# Patient Record
Sex: MALE | Race: WHITE | NOT HISPANIC OR LATINO | Employment: OTHER | URBAN - METROPOLITAN AREA
[De-identification: names, ages, dates, MRNs, and addresses within clinical notes are randomized per-mention and may not be internally consistent; named-entity substitution may affect disease eponyms.]

---

## 2022-06-09 ENCOUNTER — TELEPHONE (OUTPATIENT)
Dept: UROLOGY | Facility: CLINIC | Age: 57
End: 2022-06-09
Payer: MEDICARE

## 2022-06-09 NOTE — TELEPHONE ENCOUNTER
Spoke w pts wife who was informed our urology group does not perform left adrenal mass surgery pts wife was informed she will need to see a specialist at the main campus which is dr Clarke  pts wife voiced understanding.

## 2022-06-09 NOTE — TELEPHONE ENCOUNTER
----- Message from Amanda Sweeney sent at 6/9/2022 12:53 PM CDT -----  Regarding: sooner appointment  Contact: Wife/Kavita/[phone]  Type:  Sooner Appointment Request    Caller is requesting a sooner appointment.  Caller declined first available appointment listed below.  Caller will not accept being placed on the waitlist and is requesting a message be sent to doctor.    Name of Caller:  Wife/Kavita/[phone]  When is the first available appointment?  7/7/22  Symptoms:  large right adrenal mass

## 2022-07-21 ENCOUNTER — HOSPITAL ENCOUNTER (OUTPATIENT)
Dept: CARDIOLOGY | Facility: CLINIC | Age: 57
Discharge: HOME | End: 2022-07-21
Payer: MEDICARE

## 2022-07-21 ENCOUNTER — TELEPHONE (OUTPATIENT)
Dept: SURGERY | Facility: CLINIC | Age: 57
End: 2022-07-21
Payer: MEDICARE

## 2022-07-21 ENCOUNTER — OFFICE VISIT (OUTPATIENT)
Dept: SURGERY | Facility: CLINIC | Age: 57
End: 2022-07-21
Payer: MEDICARE

## 2022-07-21 VITALS
HEIGHT: 75 IN | HEART RATE: 62 BPM | RESPIRATION RATE: 18 BRPM | SYSTOLIC BLOOD PRESSURE: 149 MMHG | TEMPERATURE: 99 F | DIASTOLIC BLOOD PRESSURE: 84 MMHG | WEIGHT: 250 LBS | OXYGEN SATURATION: 98 % | BODY MASS INDEX: 31.08 KG/M2

## 2022-07-21 DIAGNOSIS — E27.8 ADRENAL NODULE: ICD-10-CM

## 2022-07-21 DIAGNOSIS — E27.8 OTHER SPECIFIED DISORDERS OF ADRENAL GLAND: ICD-10-CM

## 2022-07-21 DIAGNOSIS — E27.8 ADRENAL NODULE: Primary | ICD-10-CM

## 2022-07-21 DIAGNOSIS — E27.8 RIGHT ADRENAL MASS: Primary | ICD-10-CM

## 2022-07-21 PROCEDURE — 99999 PR PBB SHADOW E&M-EST. PATIENT-LVL V: CPT | Mod: PBBFAC,,, | Performed by: STUDENT IN AN ORGANIZED HEALTH CARE EDUCATION/TRAINING PROGRAM

## 2022-07-21 PROCEDURE — 99999 PR PBB SHADOW E&M-EST. PATIENT-LVL V: ICD-10-PCS | Mod: PBBFAC,,, | Performed by: STUDENT IN AN ORGANIZED HEALTH CARE EDUCATION/TRAINING PROGRAM

## 2022-07-21 PROCEDURE — 93010 ELECTROCARDIOGRAM REPORT: CPT | Mod: S$PBB,,, | Performed by: INTERNAL MEDICINE

## 2022-07-21 PROCEDURE — 93005 ELECTROCARDIOGRAM TRACING: CPT | Mod: PBBFAC | Performed by: INTERNAL MEDICINE

## 2022-07-21 PROCEDURE — 99215 OFFICE O/P EST HI 40 MIN: CPT | Mod: PBBFAC | Performed by: STUDENT IN AN ORGANIZED HEALTH CARE EDUCATION/TRAINING PROGRAM

## 2022-07-21 PROCEDURE — 99205 OFFICE O/P NEW HI 60 MIN: CPT | Mod: S$PBB,,, | Performed by: STUDENT IN AN ORGANIZED HEALTH CARE EDUCATION/TRAINING PROGRAM

## 2022-07-21 PROCEDURE — 99205 PR OFFICE/OUTPT VISIT, NEW, LEVL V, 60-74 MIN: ICD-10-PCS | Mod: S$PBB,,, | Performed by: STUDENT IN AN ORGANIZED HEALTH CARE EDUCATION/TRAINING PROGRAM

## 2022-07-21 PROCEDURE — 93010 EKG 12-LEAD: ICD-10-PCS | Mod: S$PBB,,, | Performed by: INTERNAL MEDICINE

## 2022-07-21 RX ORDER — FLUTICASONE PROPIONATE 50 MCG
1 SPRAY, SUSPENSION (ML) NASAL DAILY
COMMUNITY

## 2022-07-21 RX ORDER — DEXAMETHASONE 1 MG/1
1 TABLET ORAL ONCE
Qty: 1 TABLET | Refills: 0 | Status: SHIPPED | OUTPATIENT
Start: 2022-07-21 | End: 2022-07-21

## 2022-07-21 RX ORDER — LISINOPRIL 10 MG/1
10 TABLET ORAL DAILY
COMMUNITY
Start: 2022-05-04

## 2022-07-21 RX ORDER — VARENICLINE TARTRATE 1 MG/1
1 TABLET, FILM COATED ORAL 2 TIMES DAILY
COMMUNITY
Start: 2022-07-07 | End: 2023-07-26

## 2022-07-21 RX ORDER — IBUPROFEN 800 MG/1
800 TABLET ORAL DAILY
COMMUNITY

## 2022-07-21 RX ORDER — TRAMADOL HYDROCHLORIDE 50 MG/1
50 TABLET ORAL EVERY 6 HOURS PRN
Status: ON HOLD | COMMUNITY
End: 2022-08-23 | Stop reason: HOSPADM

## 2022-07-21 NOTE — TELEPHONE ENCOUNTER
Spoke with Martha at [facility]  [phone] Requested for SMX Ct Lumbar 4/22/22 and Spoke with Sebastian from [facility] [phone] for MRI Abdomen 5/7/22 to SMX. Sent STAT request to Ms. Walton in [employer] for request for SMX of imaging per Dr. Dunlap, can take up to 24hrs. Notified Dr. Dunlap.

## 2022-07-21 NOTE — PROGRESS NOTES
Endocrine Surgery History & Physical     REFERRING PROVIDER: Self, Aaareferral - Will be establishing care with Dr. Best (Endocrinology)    REASON FOR VISIT: Adrenal mass, right    HPI: Tomas Hong is a 56 y.o. male patient with a history notable for tobacco use, carpal tunnel syndrome, hypertension, seasonal allergies and ulcerative colitis who presents in consultation for management of an incidental right adrenal mass.   An adrenal mass was noted incidentally on CT scan in 2014 done for diffuse abdominal pain. Radiology imaging documented a right 4 x 2.7 cm adrenal adenoma with HU measuring 8.  More detailed adrenal protocol radiologic imaging was not performed.  A CT scan was done in 4/2022 at [facility] that is available for review and demonstrates interval growth to approximately 4.8 cm.    With respect to symptoms concerning for hyperaldosteronism, he does have hypertension hypertension, currently he is on lisinopril.  He does not have hypokalemia.      With respect to symptoms concerning for hypercortisolism, he denies diabetes, glucose intolerance, significant weight gain, facial plethora, thoracodorsal fat pad, acne, abnormal hair growth, central/truncal obesity, violaceous striae, poor wound healing, proximal muscle weakness, or Cushingoid features.      With respect to symptoms related to pheochromocytoma, he reports intermittent dizzy spells, he denies headaches, palpitations, tachycardia, tremors, skin flushing, anxiety attacks, weight loss, sleep disturbances, sweating.  He denies significant flank or back pain.  The patient is not currently on tricyclic antidepressants, decongestants, amphetamines, reserpine or phenoxybenzamine.    The patient denies personal or family history of endocrinopathies, endocrine cancer, thyroid cancer, medullary thyroid cancer, hyperparathyroidism, hypercalcemia, pheochromocytomas, paraganglioma, or other neuroendocrine tumors.       LABORATORY  STUDIES:  I personally and independently reviewed relevant lab test results, including the following:    Component      Latest Ref Rng & Units 7/28/2022 7/21/2022   Metanephrine, Free      < OR = 57 pg/mL  47   Metanephrine, Total, Plasma      < OR = 205 pg/mL  100   Normetanephrine, Free      < OR = 148 pg/mL  53   ALDOSTERONE      ng/dL  5.1   Renin Activity      ng/mL/h  3.4   ACTH      0 - 46 pg/mL 7    Cortisol, 8 AM      4.30 - 22.40 ug/dL 1.10 (L)         PAST MEDICAL HISTORY:  Patient Active Problem List   Diagnosis    Right adrenal mass    Hypertension    Ulcerative colitis        PAST SURGICAL HISTORY:  Past Surgical History:   Procedure Laterality Date    CARPAL TUNNEL RELEASE Left 05/01/2022    Carpel tunnel surgery left    JOINT REPLACEMENT Left 05/01/2016    Left Hip replacement        MEDICATIONS:  Current Outpatient Medications   Medication Sig Dispense Refill    fluticasone propionate (FLONASE) 50 mcg/actuation nasal spray 1 spray by Nasal route once daily at 6am.      ibuprofen (ADVIL,MOTRIN) 800 MG tablet Take 800 mg by mouth once daily at 6am.      ibuprofen 800 mg/200 mL (4 mg/mL) PgBk       lisinopriL 10 MG tablet Take 10 mg by mouth once daily.      traMADoL (ULTRAM) 50 mg tablet Take 50 mg by mouth every 6 (six) hours as needed for Pain. 3 times/ weekly      varenicline (CHANTIX) 1 mg Tab Take 1 mg by mouth 2 (two) times daily.       No current facility-administered medications for this visit.       ALLERGIES:  Review of patient's allergies indicates:  No Known Allergies    SOCIAL HISTORY:  Social History     Socioeconomic History    Marital status:    Occupational History    Occupation: disabled   Tobacco Use    Smoking status: Current Every Day Smoker     Packs/day: 0.25     Years: 40.00     Pack years: 10.00     Types: Cigarettes    Smokeless tobacco: Never Used    Tobacco comment: 2 packs  cig/ 40 years- as of 7/21/2022 5 cigs/day   Substance and Sexual Activity     "Alcohol use: No    Drug use: Never    Sexual activity: Yes     Partners: Female         FAMILY HISTORY:  History reviewed. No pertinent family history.      REVIEW OF SYSTEMS:  A detailed review of systems was reviewed with the patient, pertinent positives and negatives are presented in the note and is otherwise negative.    PHYSICAL EXAMINATION:  Vital Signs: BP (!) 149/84 (BP Location: Right arm, Patient Position: Sitting, BP Method: Medium (Automatic))   Pulse 62   Temp 98.8 °F (37.1 °C) (Oral)   Resp 18   Ht 6' 3" (1.905 m)   Wt 113.4 kg (250 lb)   SpO2 98%   BMI 31.25 kg/m²     Constitutional: no acute distress, comfortable, well appearing, no hirsutism  HENT: no scleral icterus, conjunctiva are clear, no supraclavicular/dorsocervical fat pad, no facial plethora  Neck: supple, trachea in midline  Heme/Lymph: no cervical or supraclavicular lymphadenopathy  Respiratory: normal respiratory effort, no wheezes or stridor  Cardiovascular: regular rate and rhythm  Abdomen: soft, non distended, no scars, no hernias, no central obesity  Extremities: no edema  Skin: warm and dry, no rashes, no violacious striae, no hyperpigmentation, no acne  Neurologic: no resting tremor of outstretched hands, voice adequate, hip flexion 5/5 bilaterally, no gross focal defects  Vascular: radial pulses palpable bilaterally  Psychiatric: affect normal      IMAGING STUDIES:  I personally and independently reviewed, visualized and interpreted the images of the below listed radiology studies (including non contrasted limited CT from 4/22/22 done at [facility] and prior CT done in 2014) my findings are notable for a lipid rich right adrenal mass measuring over 4.4cm with interval growth since 2014.  Adrenal protocol CT pending.    IMPRESSION:  I had the pleasure of seeing Mr. Hong in endocrine surgical consultation regarding the >4cm right adrenal mass.  I discussed the implications and treatment of incidentally noted " adrenal masses.  The majority of these are benign non-functional tumors.  I have recommended further laboratory evaluation to rule out a functional tumor and dedicated adrenal protocol CT scan.    I have discussed with Mr. Hong at length regarding the current surgical and non-surgical treatment options.  Based on the current clinical findings and patient's preference he will necessitate a right laparoscopic assisted robotic adrenalectomy.    We discussed the risks and benefits of adrenalectomy, specifically the risk of significant bleeding, scarring, infection, hernia, injury to adjacent organs (notably the liver, duodenum, colon and kidney) necessitating additional procedures or drains, anesthesia risks, death and stroke.  We also discussed the risk of postoperative adrenal insufficiency and the potential need for steroid replacement temporarily or permanently after surgery.    The patient expressed understanding of all the risks at hand, agreed with this plan and informed consent was signed and witnessed.  The patient has my contact information, and understands to call me with any additional questions or concerns in the interval.      Problem List Items Addressed This Visit        Endocrine    Right adrenal mass - Primary    Relevant Orders    CBC Auto Differential (Completed)    Comprehensive Metabolic Panel (Completed)    Metanephrines, Plasma Free (Completed)    Aldosterone (Completed)    Renin (Completed)    ACTH (Completed)    Cortisol, 8AM (Completed)      Other Visit Diagnoses     Other specified disorders of adrenal gland        Relevant Orders    CT Abdomen With Without Contrast         Rocio Dunlap MD  Endocrine Surgery   7/21/22

## 2022-07-21 NOTE — PATIENT INSTRUCTIONS
I would like to check the additional laboratory studies to evaluate for abnormal adrenal hormone production.      Prior to having your labs drawn, please  the dexamethasone tablet from the pharmacy.     Day 1:  - Please get the first set of labs (aldosterone, renin, plasma metanephrines, CBC, CMP)    Day 2:   - Take the dexamethasone tablet at 11pm the NIGHT BEFORE  - At 8 AM you will have additional labs checked (cortisol and dexamethasone levels)  - You will have an appointment with Dr. Best (Endocrinology)

## 2022-07-27 PROBLEM — K51.90 ULCERATIVE COLITIS: Status: ACTIVE | Noted: 2022-07-27

## 2022-07-27 PROBLEM — I10 HYPERTENSION: Status: ACTIVE | Noted: 2022-07-27

## 2022-07-27 NOTE — PROGRESS NOTES
"Subjective:      Chief Complaint:  Right-sided adrenal adenoma    History of Present Illness  56 y.o. male with ulcerative colitis presents for establishment of care regarding right-sided adrenal mass.  Patient had been seen by Dr. Dunlap of Endocrine surgery on 07/21/2022 regarding mass and appropriate functional testing with dexamethasone suppression test with ACTH, renin/aldosterone, and plasma metanephrines had been ordered.    - Has occasional dizzy spells about once a week, no associated activities, no LOC, no palpitations, notices that he "pulls to the right" while walking during these spells which last 20-30 seconds.     Regarding Right Adrenal Incidentaloma:     - Found to have an incidental right adrenal nodule on CT scan in July, 2014  - Dedicated imaging has not yet been performed  - Adrenal lesion imaging characteristics: 4 cm; 16 HU on initial CT in 2014    CT scan from 07/30/2014      CT scan from 04/22/2022      - Functional evaluation with plasma and urine metanephrines: In process    - Baseline ACTH: None (ordered)  - Baseline DHEAS: None  - Last BMD: None  - Normal aldosterone level of 5.1 with a normal renin level of 3.4 on 07/21/2022.  Normal dexamethasone suppression test with cortisol of 1.1 on 07/28/2022.  Lab Results   Component Value Date    LABCORT 1.10 (L) 07/28/2022    ALDOSTERONE 5.1 07/21/2022    LABRENI 3.4 07/21/2022       - Symptoms of excess cortisol or pheochromocytoma: Abdominal discomfort, hypertension, paroxysmal symptoms (syncope, pallor, dizziness; see HPI above), polyuria/polydipsia, easy bruisability  - Denies: palpitations, diabetes/new hyperglycemia, abnormal stretch marks, muscle weakness, fractures or osteoporosis    - Contributing factors: Weight gain  - Denies: atherosclerosis, potassium abnormalities, recent steroids, recent clonidine, recent spironolactone, family history of adrenal cortical carcinoma     - I independently reviewed all pertinent outside records and " "imaging    Objective:   /80 (BP Location: Left arm, Patient Position: Sitting, BP Method: Large (Manual))   Ht 6' 3" (1.905 m)   Wt 113 kg (249 lb 3.7 oz)   BMI 31.15 kg/m²   Physical Exam  Constitutional:       Appearance: Normal appearance.   Cardiovascular:      Rate and Rhythm: Normal rate and regular rhythm.      Pulses: Normal pulses.      Heart sounds: Normal heart sounds.   Pulmonary:      Effort: Pulmonary effort is normal.      Breath sounds: Normal breath sounds.   Abdominal:      General: Abdomen is flat.      Palpations: Abdomen is soft.      Comments: No striae   Neurological:      General: No focal deficit present.      Mental Status: He is alert and oriented to person, place, and time.   Psychiatric:         Mood and Affect: Mood normal.         Behavior: Behavior normal.       BP Readings from Last 1 Encounters:   07/28/22 138/80      Wt Readings from Last 1 Encounters:   07/28/22 0845 113 kg (249 lb 3.7 oz)     Body mass index is 31.15 kg/m².    Lab Review:   No results found for: HGBA1C  No results found for: CHOL, HDL, LDLCALC, TRIG, CHOLHDL  Lab Results   Component Value Date     07/21/2022    K 4.9 07/21/2022     07/21/2022    CO2 28 07/21/2022    GLU 87 07/21/2022    BUN 13 07/21/2022    CREATININE 1.3 07/21/2022    CALCIUM 9.8 07/21/2022    PROT 7.9 07/21/2022    ALBUMIN 4.7 07/21/2022    BILITOT 0.4 07/21/2022    ALKPHOS 71 07/21/2022    AST 27 07/21/2022    ALT 26 07/21/2022    ANIONGAP 10 07/21/2022    ESTGFRAFRICA >60.0 07/21/2022    EGFRNONAA >60.0 07/21/2022       All pertinent labs reviewed    Assessment and Plan     Right adrenal mass  Key History and Diagnostic Findings  - Normal aldosterone level of 5.1 with a normal renin level of 3.4 on 07/21/2022.  Normal dexamethasone suppression test with cortisol of 1.1 on 07/28/2022.    Plan  - Await results of plasma metanephrines for assessment of pheochromocytoma  - Will await endocrine surgery plan after biochemical " testing    - I have independently reviewed the imaging    Hypertension  - Currently on lisinopril 10 mg daily, not on any medications that would interfere with workup for pheochromocytoma  - Await biochemical testing as above    Ulcerative colitis  - Patient takes ibuprofen on an almost daily basis for lower back pain; he was counseled again such regular use and to use Tylenol when able to prevent gastric ulceration and renal damage that may contribute hypertension  - Management per ALOFNSO Trcay DO  [facility], [address]th [address]  [address] [place], [place], [address]    Office: [phone]  Fax: [phone]    Disclaimer: This note has been generated using voice-recognition software. There may be typographical errors that have been missed during proof-reading.    The above history labs imaging impression and plan were discussed with attending physician who is in agreement and also took part in this patient's care.  I personally reviewed all of the patients available medications, labs, imaging, vitals, allergies, medical history.

## 2022-07-27 NOTE — ASSESSMENT & PLAN NOTE
Key History and Diagnostic Findings  - Normal aldosterone level of 5.1 with a normal renin level of 3.4 on 07/21/2022.  Normal dexamethasone suppression test with cortisol of 1.1 on 07/28/2022.    Plan  - Await results of plasma metanephrines for assessment of pheochromocytoma  - Will await endocrine surgery plan after biochemical testing    - I have independently reviewed the imaging

## 2022-07-27 NOTE — ASSESSMENT & PLAN NOTE
- Currently on lisinopril 10 mg daily, not on any medications that would interfere with workup for pheochromocytoma  - Await biochemical testing as above

## 2022-07-27 NOTE — ASSESSMENT & PLAN NOTE
- Patient takes ibuprofen on an almost daily basis for lower back pain; he was counseled again such regular use and to use Tylenol when able to prevent gastric ulceration and renal damage that may contribute hypertension  - Management per GI

## 2022-07-28 ENCOUNTER — LAB VISIT (OUTPATIENT)
Dept: LAB | Facility: HOSPITAL | Age: 57
End: 2022-07-28
Attending: STUDENT IN AN ORGANIZED HEALTH CARE EDUCATION/TRAINING PROGRAM
Payer: MEDICARE

## 2022-07-28 ENCOUNTER — OFFICE VISIT (OUTPATIENT)
Dept: ENDOCRINOLOGY | Facility: CLINIC | Age: 57
End: 2022-07-28
Payer: MEDICARE

## 2022-07-28 VITALS
DIASTOLIC BLOOD PRESSURE: 80 MMHG | SYSTOLIC BLOOD PRESSURE: 138 MMHG | HEIGHT: 75 IN | BODY MASS INDEX: 30.99 KG/M2 | WEIGHT: 249.25 LBS

## 2022-07-28 DIAGNOSIS — E27.8 RIGHT ADRENAL MASS: ICD-10-CM

## 2022-07-28 DIAGNOSIS — K51.00 ULCERATIVE PANCOLITIS WITHOUT COMPLICATION: ICD-10-CM

## 2022-07-28 DIAGNOSIS — I10 HYPERTENSION, UNSPECIFIED TYPE: ICD-10-CM

## 2022-07-28 LAB — CORTIS SERPL-MCNC: 1.1 UG/DL (ref 4.3–22.4)

## 2022-07-28 PROCEDURE — 99204 PR OFFICE/OUTPT VISIT, NEW, LEVL IV, 45-59 MIN: ICD-10-PCS | Mod: S$PBB,,, | Performed by: GENERAL ACUTE CARE HOSPITAL

## 2022-07-28 PROCEDURE — 99999 PR PBB SHADOW E&M-EST. PATIENT-LVL III: ICD-10-PCS | Mod: PBBFAC,,, | Performed by: GENERAL ACUTE CARE HOSPITAL

## 2022-07-28 PROCEDURE — 82024 ASSAY OF ACTH: CPT | Performed by: STUDENT IN AN ORGANIZED HEALTH CARE EDUCATION/TRAINING PROGRAM

## 2022-07-28 PROCEDURE — 99999 PR PBB SHADOW E&M-EST. PATIENT-LVL III: CPT | Mod: PBBFAC,,, | Performed by: GENERAL ACUTE CARE HOSPITAL

## 2022-07-28 PROCEDURE — 99204 OFFICE O/P NEW MOD 45 MIN: CPT | Mod: S$PBB,,, | Performed by: GENERAL ACUTE CARE HOSPITAL

## 2022-07-28 PROCEDURE — 82533 TOTAL CORTISOL: CPT | Performed by: STUDENT IN AN ORGANIZED HEALTH CARE EDUCATION/TRAINING PROGRAM

## 2022-07-28 PROCEDURE — 99213 OFFICE O/P EST LOW 20 MIN: CPT | Mod: PBBFAC | Performed by: GENERAL ACUTE CARE HOSPITAL

## 2022-07-28 NOTE — PATIENT INSTRUCTIONS
- Await lab results: if normal (cortisol less than 2 and metanephrines less than 2 times the upper limit of normal) then proceed to surgery with Dr. Dunlap and follow up pathology analysis of the tissue afterward  - If labs show high cortisol or very high metanephrines, will then discuss next steps  - Cut down on ibuprofen use if possible; consider using tylenol intermittently to help protect the kidneys and prevent stomach ulcers

## 2022-07-28 NOTE — PROGRESS NOTES
I have seen the patient, reviewed the Fellow's history and physical, assessment, and plan. I have personally interviewed and examined the patient at bedside and agree with the findings.       Reyes Mitchell MD  Endocrinology  [facility]

## 2022-07-29 LAB — ACTH PLAS-MCNC: 7 PG/ML (ref 0–46)

## 2022-08-01 ENCOUNTER — PATIENT MESSAGE (OUTPATIENT)
Dept: ENDOCRINOLOGY | Facility: HOSPITAL | Age: 57
End: 2022-08-01
Payer: MEDICARE

## 2022-08-01 ENCOUNTER — PATIENT MESSAGE (OUTPATIENT)
Dept: SURGERY | Facility: CLINIC | Age: 57
End: 2022-08-01
Payer: MEDICARE

## 2022-08-01 DIAGNOSIS — E27.8 RIGHT ADRENAL MASS: Primary | ICD-10-CM

## 2022-08-02 ENCOUNTER — TELEPHONE (OUTPATIENT)
Dept: SURGERY | Facility: CLINIC | Age: 57
End: 2022-08-02
Payer: MEDICARE

## 2022-08-03 DIAGNOSIS — E27.8 RIGHT ADRENAL MASS: Primary | ICD-10-CM

## 2022-08-03 RX ORDER — ONDANSETRON 2 MG/ML
4 INJECTION INTRAMUSCULAR; INTRAVENOUS EVERY 12 HOURS PRN
Status: CANCELLED | OUTPATIENT
Start: 2022-08-03

## 2022-08-03 RX ORDER — LIDOCAINE HYDROCHLORIDE 10 MG/ML
1 INJECTION, SOLUTION EPIDURAL; INFILTRATION; INTRACAUDAL; PERINEURAL ONCE
Status: CANCELLED | OUTPATIENT
Start: 2022-08-03 | End: 2022-08-03

## 2022-08-03 RX ORDER — SODIUM CHLORIDE 9 MG/ML
INJECTION, SOLUTION INTRAVENOUS CONTINUOUS
Status: CANCELLED | OUTPATIENT
Start: 2022-08-03

## 2022-08-03 RX ORDER — CEFAZOLIN SODIUM/D5W 2 G/100 ML
2 PLASTIC BAG, INJECTION (ML) INTRAVENOUS
Status: CANCELLED | OUTPATIENT
Start: 2022-08-23

## 2022-08-04 ENCOUNTER — HOSPITAL ENCOUNTER (OUTPATIENT)
Dept: RADIOLOGY | Facility: HOSPITAL | Age: 57
Discharge: HOME | End: 2022-08-04
Attending: STUDENT IN AN ORGANIZED HEALTH CARE EDUCATION/TRAINING PROGRAM
Payer: MEDICARE

## 2022-08-04 DIAGNOSIS — E27.8 OTHER SPECIFIED DISORDERS OF ADRENAL GLAND: ICD-10-CM

## 2022-08-04 PROCEDURE — 74170 CT ABD WO CNTRST FLWD CNTRST: CPT | Mod: 26,,, | Performed by: RADIOLOGY

## 2022-08-04 PROCEDURE — 25500020 PHARM REV CODE 255

## 2022-08-04 PROCEDURE — 74170 CT ABDOMEN W WO CONTRAST: ICD-10-PCS | Mod: 26,,, | Performed by: RADIOLOGY

## 2022-08-04 PROCEDURE — 74170 CT ABD WO CNTRST FLWD CNTRST: CPT | Mod: TC

## 2022-08-04 RX ADMIN — IOHEXOL 100 ML: 350 INJECTION, SOLUTION INTRAVENOUS at 11:08

## 2022-08-19 ENCOUNTER — TELEPHONE (OUTPATIENT)
Dept: SURGERY | Facility: CLINIC | Age: 57
End: 2022-08-19
Payer: MEDICARE

## 2022-08-19 ENCOUNTER — PATIENT MESSAGE (OUTPATIENT)
Dept: SURGERY | Facility: CLINIC | Age: 57
End: 2022-08-19
Payer: MEDICARE

## 2022-08-19 DIAGNOSIS — Z01.818 PRE-OP TESTING: ICD-10-CM

## 2022-08-19 NOTE — TELEPHONE ENCOUNTER
Phoned pt. Pre-operative instructions given to pt verbally. Instructed to report to the [place] [facility] at [facility] Day of Surgery for 5 am, case start 7a Tuesday 8/23/22. Instructed to please report to pre-admit appt Monday- preop labs cbc, cmp will be done. Pt states he will be staying at [facility] at [facility]. Taught to shower with Hibiclens Monday night and morning of surgery.  Instructed nothing to eat after 9 PM on Monday, ok for sips of clears until 2 hrs prior to surgery. Confirmed post op appt 9/8, directions given. All questions addressed, Pt verbalized understanding.

## 2022-08-20 ENCOUNTER — ANESTHESIA EVENT (OUTPATIENT)
Dept: SURGERY | Facility: OTHER | Age: 57
DRG: 614 | End: 2022-08-20
Payer: MEDICARE

## 2022-08-20 ENCOUNTER — LAB VISIT (OUTPATIENT)
Dept: PRIMARY CARE CLINIC | Facility: CLINIC | Age: 57
DRG: 614 | End: 2022-08-20
Payer: MEDICARE

## 2022-08-20 DIAGNOSIS — Z01.818 PRE-OP TESTING: ICD-10-CM

## 2022-08-20 PROCEDURE — U0005 INFEC AGEN DETEC AMPLI PROBE: HCPCS | Performed by: STUDENT IN AN ORGANIZED HEALTH CARE EDUCATION/TRAINING PROGRAM

## 2022-08-20 PROCEDURE — U0003 INFECTIOUS AGENT DETECTION BY NUCLEIC ACID (DNA OR RNA); SEVERE ACUTE RESPIRATORY SYNDROME CORONAVIRUS 2 (SARS-COV-2) (CORONAVIRUS DISEASE [COVID-19]), AMPLIFIED PROBE TECHNIQUE, MAKING USE OF HIGH THROUGHPUT TECHNOLOGIES AS DESCRIBED BY CMS-2020-01-R: HCPCS | Performed by: STUDENT IN AN ORGANIZED HEALTH CARE EDUCATION/TRAINING PROGRAM

## 2022-08-21 LAB — SARS-COV-2 RNA RESP QL NAA+PROBE: NOT DETECTED

## 2022-08-22 ENCOUNTER — HOSPITAL ENCOUNTER (OUTPATIENT)
Dept: PREADMISSION TESTING | Facility: OTHER | Age: 57
Discharge: HOME | End: 2022-08-22
Attending: STUDENT IN AN ORGANIZED HEALTH CARE EDUCATION/TRAINING PROGRAM
Payer: MEDICARE

## 2022-08-22 ENCOUNTER — TELEPHONE (OUTPATIENT)
Dept: SURGERY | Facility: CLINIC | Age: 57
End: 2022-08-22
Payer: MEDICARE

## 2022-08-22 VITALS
TEMPERATURE: 98 F | WEIGHT: 249 LBS | HEART RATE: 70 BPM | RESPIRATION RATE: 16 BRPM | HEIGHT: 75 IN | BODY MASS INDEX: 30.96 KG/M2 | DIASTOLIC BLOOD PRESSURE: 87 MMHG | SYSTOLIC BLOOD PRESSURE: 132 MMHG | OXYGEN SATURATION: 97 %

## 2022-08-22 RX ORDER — SODIUM CHLORIDE, SODIUM LACTATE, POTASSIUM CHLORIDE, CALCIUM CHLORIDE 600; 310; 30; 20 MG/100ML; MG/100ML; MG/100ML; MG/100ML
INJECTION, SOLUTION INTRAVENOUS CONTINUOUS
Status: CANCELLED | OUTPATIENT
Start: 2022-08-22

## 2022-08-22 RX ORDER — ACETAMINOPHEN 500 MG
1000 TABLET ORAL
Status: CANCELLED | OUTPATIENT
Start: 2022-08-22 | End: 2022-08-22

## 2022-08-22 RX ORDER — LIDOCAINE HYDROCHLORIDE 10 MG/ML
0.5 INJECTION, SOLUTION EPIDURAL; INFILTRATION; INTRACAUDAL; PERINEURAL ONCE
Status: CANCELLED | OUTPATIENT
Start: 2022-08-22 | End: 2022-08-22

## 2022-08-22 NOTE — TELEPHONE ENCOUNTER
Pre-operative instructions given to pt verbally. Instructed to report to the [place] [facility] at [facility] Day of Surgery for 5 am tomorrow 7am. Taught to shower with Hibiclens tonight and morning of surgery.  Instructed nothing to eat after 9 PM, ok for sips of clears until 2 hrs prior to surgery. Covid test negative. All questions addressed, Pt verbalized understanding. Pt checking in at pre-admit appt.

## 2022-08-22 NOTE — ANESTHESIA PREPROCEDURE EVALUATION
08/22/2022  Tomas Hong is a 57 y.o., male.      Pre-op Assessment    I have reviewed the Patient Summary Reports.     I have reviewed the Nursing Notes. I have reviewed the NPO Status.   I have reviewed the Medications.     Review of Systems  Anesthesia Hx:  Denies Family Hx of Anesthesia complications.   Denies Personal Hx of Anesthesia complications.   Social:  Smoker    Hematology/Oncology:  Hematology Normal   Oncology Normal     EENT/Dental:EENT/Dental Normal   Cardiovascular:   Hypertension    Pulmonary:  Pulmonary Normal    Renal/:  Renal/ Normal     Hepatic/GI:   PUD,    Musculoskeletal:  Musculoskeletal Normal    Neurological:  Neurology Normal    Endocrine:  Endocrine Normal Adrenal adenoma   Dermatological:  Skin Normal    Psych:  Psychiatric Normal           Physical Exam  General: Well nourished and Cooperative    Airway:  Mallampati: III   Mouth Opening: Normal  TM Distance: Normal  Neck ROM: Normal ROM    Dental:  Intact        Anesthesia Plan  Type of Anesthesia, risks & benefits discussed:    Anesthesia Type: Gen ETT  Intra-op Monitoring Plan: Standard ASA Monitors  Post Op Pain Control Plan: multimodal analgesia  Induction:  IV  Airway Plan: Video  Informed Consent: Informed consent signed with the Patient and all parties understand the risks and agree with anesthesia plan.  All questions answered.   ASA Score: 3  Anesthesia Plan Notes: Labs in epic ok from 7/21    Labs not c/w pheo    Ready For Surgery From Anesthesia Perspective.     .

## 2022-08-22 NOTE — DISCHARGE INSTRUCTIONS
Information to Prepare you for your Surgery    PRE-ADMIT TESTING -  [phone]    [address] [facility]          Your surgery has been scheduled at [facility]. We are pleased to have the opportunity to serve you. For Further Information please call [phone].    On the day of surgery please report to the Information Desk on the 1st floor.    CONTACT YOUR PHYSICIAN'S OFFICE THE DAY PRIOR TO YOUR SURGERY TO OBTAIN YOUR ARRIVAL TIME.     The evening before surgery do not eat anything after 9 p.m. ( this includes hard candy, chewing gum and mints).  You may only have GATORADE, POWERADE AND WATER  from 9 p.m. until you leave your home.   DO NOT DRINK ANY LIQUIDS ON THE WAY TO THE HOSPITAL.      Why does your anesthesiologist allow you to drink Gatorade/Powerade before surgery?  Gatorade/Powerade helps to increase your comfort before surgery and to decrease your nausea after surgery. The carbohydrates in Gatorade/Powerade help reduce your body's stress response to surgery.  If you are a diabetic-drink only water prior to surgery.      Current Visitor policy(12/27/2021) - Patients may have 2 visitors pre and post procedure. Only 2 visitors will be allowed in the Surgical building with the patient.     SPECIAL MEDICATION INSTRUCTIONS: TAKE medications checked off by the Anesthesiologist on your Medication List.    Surgery Patients:  If you take ASPIRIN - Your PHYSICIAN/SURGEON will need to inform you IF/OR when you need to stop taking aspirin prior to your surgery.     Do Not take any medications containing IBUPROFEN.    Do Not Wear any make-up (especially eye make-up) to surgery. Please remove any false eyelashes or eyelash extensions. If you arrive the day of surgery with makeup/eyelashes on you will be required to remove prior to surgery. (There is a risk of corneal abrasions if eye makeup/eyelash extensions are not removed)      Leave all valuables at home.   Do Not wear any  jewelry or watches, including any metal in body piercings. Jewelry must be removed prior to coming to the hospital.  There is a possibility that rings that are unable to be removed may be cut off if they are on the surgical extremity.    Please remove all hair extensions, wigs, clips and any other metal accessories/ ornaments from your hair.  These items may pose a flammable/fire risk in Surgery and must be removed.    Do not shave your surgical area at least 5 days prior to your surgery. The surgical prep will be performed at the hospital according to Infection Control regulations.    Contact Lens must be removed before surgery. Either do not wear the contact lens or bring a case and solution for storage.  Please bring a container for eyeglasses or dentures as required.  Bring any paperwork your physician has provided, such as consent forms,  history and physicals, doctor's orders, etc.   Bring comfortable clothes that are loose fitting to wear upon discharge. Take into consideration the type of surgery being performed.  Maintain your diet as advised per your physician the day prior to surgery.      Adequate rest the night before surgery is advised.   Park in the Parking lot behind the hospital or in the [facility] across the street from the parking lot. Parking is complimentary.  If you will be discharged the same day as your procedure, please arrange for a responsible adult to drive you home or to accompany you if traveling by taxi.   YOU WILL NOT BE PERMITTED TO DRIVE OR TO LEAVE THE HOSPITAL ALONE AFTER SURGERY.   If you are being discharged the same day, it is strongly recommended that you arrange for someone to remain with you for the first 24 hrs following your surgery.    The Surgeon will speak to your family/visitor after your surgery regarding the outcome of your surgery and post op care.  The Surgeon may speak to you after your surgery, but there is a possibility you may not remember the  details.  Please check with your family members regarding the conversation with the Surgeon.    We strongly recommend whoever is bringing you home be present for discharge instructions.  This will ensure a thorough understanding for your post op home care.    ALL CHILDREN MUST ALWAYS BE ACCOMPANIED BY AN ADULT.    Visitors-Refer to current Visitor policy handouts.    Thank you for your cooperation.  The Staff of [facility].            Bathing Instructions with Hibiclens    Shower the evening before and morning of your procedure with Hibiclens:  Wash your face with water and your regular face wash/soap  Apply Hibiclens directly on your skin or on a wet washcloth and wash gently. When showering: Move away from the shower stream when applying Hibiclens to avoid rinsing off too soon.  Rinse thoroughly with warm water  Do not dilute Hibiclens        Dry off as usual, do not use any deodorant, powder, body lotions, perfume, after shave or cologne.

## 2022-08-23 ENCOUNTER — ANESTHESIA (OUTPATIENT)
Dept: SURGERY | Facility: OTHER | Age: 57
DRG: 614 | End: 2022-08-23
Payer: MEDICARE

## 2022-08-23 ENCOUNTER — HOSPITAL ENCOUNTER (INPATIENT)
Facility: OTHER | Age: 57
LOS: 1 days | Discharge: HOME | DRG: 614 | End: 2022-08-24
Attending: STUDENT IN AN ORGANIZED HEALTH CARE EDUCATION/TRAINING PROGRAM | Admitting: STUDENT IN AN ORGANIZED HEALTH CARE EDUCATION/TRAINING PROGRAM
Payer: MEDICARE

## 2022-08-23 DIAGNOSIS — E27.8 RIGHT ADRENAL MASS: Primary | ICD-10-CM

## 2022-08-23 LAB
ALBUMIN SERPL BCP-MCNC: 4.2 G/DL (ref 3.5–5.2)
ALBUMIN SERPL BCP-MCNC: 4.2 G/DL (ref 3.5–5.2)
ALP SERPL-CCNC: 65 U/L (ref 55–135)
ALT SERPL W/O P-5'-P-CCNC: 34 U/L (ref 10–44)
ANION GAP SERPL CALC-SCNC: 9 MMOL/L (ref 8–16)
ANION GAP SERPL CALC-SCNC: 9 MMOL/L (ref 8–16)
AST SERPL-CCNC: 28 U/L (ref 10–40)
BASOPHILS # BLD AUTO: 0.02 K/UL (ref 0–0.2)
BASOPHILS NFR BLD: 0.3 % (ref 0–1.9)
BILIRUB SERPL-MCNC: 0.4 MG/DL (ref 0.1–1)
BUN SERPL-MCNC: 18 MG/DL (ref 6–20)
BUN SERPL-MCNC: 18 MG/DL (ref 6–20)
CALCIUM SERPL-MCNC: 9.4 MG/DL (ref 8.7–10.5)
CALCIUM SERPL-MCNC: 9.4 MG/DL (ref 8.7–10.5)
CHLORIDE SERPL-SCNC: 107 MMOL/L (ref 95–110)
CHLORIDE SERPL-SCNC: 107 MMOL/L (ref 95–110)
CO2 SERPL-SCNC: 21 MMOL/L (ref 23–29)
CO2 SERPL-SCNC: 21 MMOL/L (ref 23–29)
CREAT SERPL-MCNC: 1.1 MG/DL (ref 0.5–1.4)
CREAT SERPL-MCNC: 1.1 MG/DL (ref 0.5–1.4)
DIFFERENTIAL METHOD: ABNORMAL
EOSINOPHIL # BLD AUTO: 0.3 K/UL (ref 0–0.5)
EOSINOPHIL NFR BLD: 3.7 % (ref 0–8)
ERYTHROCYTE [DISTWIDTH] IN BLOOD BY AUTOMATED COUNT: 14 % (ref 11.5–14.5)
EST. GFR  (NO RACE VARIABLE): >60 ML/MIN/1.73 M^2
EST. GFR  (NO RACE VARIABLE): >60 ML/MIN/1.73 M^2
GLUCOSE SERPL-MCNC: 75 MG/DL (ref 70–110)
GLUCOSE SERPL-MCNC: 75 MG/DL (ref 70–110)
HCT VFR BLD AUTO: 43.4 % (ref 40–54)
HGB BLD-MCNC: 15 G/DL (ref 14–18)
IMM GRANULOCYTES # BLD AUTO: 0.02 K/UL (ref 0–0.04)
IMM GRANULOCYTES NFR BLD AUTO: 0.3 % (ref 0–0.5)
LYMPHOCYTES # BLD AUTO: 2.9 K/UL (ref 1–4.8)
LYMPHOCYTES NFR BLD: 42.2 % (ref 18–48)
MCH RBC QN AUTO: 32.3 PG (ref 27–31)
MCHC RBC AUTO-ENTMCNC: 34.6 G/DL (ref 32–36)
MCV RBC AUTO: 94 FL (ref 82–98)
MONOCYTES # BLD AUTO: 0.6 K/UL (ref 0.3–1)
MONOCYTES NFR BLD: 9 % (ref 4–15)
NEUTROPHILS # BLD AUTO: 3.1 K/UL (ref 1.8–7.7)
NEUTROPHILS NFR BLD: 44.5 % (ref 38–73)
NRBC BLD-RTO: 0 /100 WBC
PHOSPHATE SERPL-MCNC: 2.8 MG/DL (ref 2.7–4.5)
PLATELET # BLD AUTO: 201 K/UL (ref 150–450)
PMV BLD AUTO: 10 FL (ref 9.2–12.9)
POTASSIUM SERPL-SCNC: 4.3 MMOL/L (ref 3.5–5.1)
POTASSIUM SERPL-SCNC: 4.3 MMOL/L (ref 3.5–5.1)
PROT SERPL-MCNC: 7.2 G/DL (ref 6–8.4)
RBC # BLD AUTO: 4.64 M/UL (ref 4.6–6.2)
SODIUM SERPL-SCNC: 137 MMOL/L (ref 136–145)
SODIUM SERPL-SCNC: 137 MMOL/L (ref 136–145)
WBC # BLD AUTO: 6.97 K/UL (ref 3.9–12.7)

## 2022-08-23 PROCEDURE — 80053 COMPREHEN METABOLIC PANEL: CPT | Performed by: STUDENT IN AN ORGANIZED HEALTH CARE EDUCATION/TRAINING PROGRAM

## 2022-08-23 PROCEDURE — 11000001 HC ACUTE MED/SURG PRIVATE ROOM

## 2022-08-23 PROCEDURE — 25000003 PHARM REV CODE 250: Performed by: STUDENT IN AN ORGANIZED HEALTH CARE EDUCATION/TRAINING PROGRAM

## 2022-08-23 PROCEDURE — 63600175 PHARM REV CODE 636 W HCPCS: Performed by: NURSE ANESTHETIST, CERTIFIED REGISTERED

## 2022-08-23 PROCEDURE — C9290 INJ, BUPIVACAINE LIPOSOME: HCPCS | Performed by: ANESTHESIOLOGY

## 2022-08-23 PROCEDURE — 36000712 HC OR TIME LEV V 1ST 15 MIN: Performed by: STUDENT IN AN ORGANIZED HEALTH CARE EDUCATION/TRAINING PROGRAM

## 2022-08-23 PROCEDURE — 27201423 OPTIME MED/SURG SUP & DEVICES STERILE SUPPLY: Performed by: STUDENT IN AN ORGANIZED HEALTH CARE EDUCATION/TRAINING PROGRAM

## 2022-08-23 PROCEDURE — 60650 PR LAP,ADRENALECTOMY: ICD-10-PCS | Mod: RT,,, | Performed by: STUDENT IN AN ORGANIZED HEALTH CARE EDUCATION/TRAINING PROGRAM

## 2022-08-23 PROCEDURE — 88307 TISSUE EXAM BY PATHOLOGIST: CPT | Performed by: PATHOLOGY

## 2022-08-23 PROCEDURE — 84100 ASSAY OF PHOSPHORUS: CPT | Performed by: STUDENT IN AN ORGANIZED HEALTH CARE EDUCATION/TRAINING PROGRAM

## 2022-08-23 PROCEDURE — 64486 TAP BLOCK UNIL BY INJECTION: CPT | Performed by: ANESTHESIOLOGY

## 2022-08-23 PROCEDURE — 25000003 PHARM REV CODE 250: Performed by: ANESTHESIOLOGY

## 2022-08-23 PROCEDURE — 71000039 HC RECOVERY, EACH ADD'L HOUR: Performed by: STUDENT IN AN ORGANIZED HEALTH CARE EDUCATION/TRAINING PROGRAM

## 2022-08-23 PROCEDURE — 36000713 HC OR TIME LEV V EA ADD 15 MIN: Performed by: STUDENT IN AN ORGANIZED HEALTH CARE EDUCATION/TRAINING PROGRAM

## 2022-08-23 PROCEDURE — 88307 PR  SURG PATH,LEVEL V: ICD-10-PCS | Mod: 26,,, | Performed by: PATHOLOGY

## 2022-08-23 PROCEDURE — 60650 LAPAROSCOPY ADRENALECTOMY: CPT | Mod: RT,,, | Performed by: STUDENT IN AN ORGANIZED HEALTH CARE EDUCATION/TRAINING PROGRAM

## 2022-08-23 PROCEDURE — 71000033 HC RECOVERY, INTIAL HOUR: Performed by: STUDENT IN AN ORGANIZED HEALTH CARE EDUCATION/TRAINING PROGRAM

## 2022-08-23 PROCEDURE — P9045 ALBUMIN (HUMAN), 5%, 250 ML: HCPCS | Mod: JG | Performed by: NURSE ANESTHETIST, CERTIFIED REGISTERED

## 2022-08-23 PROCEDURE — 63600175 PHARM REV CODE 636 W HCPCS: Performed by: STUDENT IN AN ORGANIZED HEALTH CARE EDUCATION/TRAINING PROGRAM

## 2022-08-23 PROCEDURE — 63600175 PHARM REV CODE 636 W HCPCS

## 2022-08-23 PROCEDURE — 94799 UNLISTED PULMONARY SVC/PX: CPT

## 2022-08-23 PROCEDURE — 99900035 HC TECH TIME PER 15 MIN (STAT)

## 2022-08-23 PROCEDURE — 63600175 PHARM REV CODE 636 W HCPCS: Performed by: ANESTHESIOLOGY

## 2022-08-23 PROCEDURE — 25000003 PHARM REV CODE 250: Performed by: NURSE ANESTHETIST, CERTIFIED REGISTERED

## 2022-08-23 PROCEDURE — 36415 COLL VENOUS BLD VENIPUNCTURE: CPT | Performed by: STUDENT IN AN ORGANIZED HEALTH CARE EDUCATION/TRAINING PROGRAM

## 2022-08-23 PROCEDURE — 85025 COMPLETE CBC W/AUTO DIFF WBC: CPT | Performed by: STUDENT IN AN ORGANIZED HEALTH CARE EDUCATION/TRAINING PROGRAM

## 2022-08-23 PROCEDURE — 37000009 HC ANESTHESIA EA ADD 15 MINS: Performed by: STUDENT IN AN ORGANIZED HEALTH CARE EDUCATION/TRAINING PROGRAM

## 2022-08-23 PROCEDURE — 88307 TISSUE EXAM BY PATHOLOGIST: CPT | Mod: 26,,, | Performed by: PATHOLOGY

## 2022-08-23 PROCEDURE — 37000008 HC ANESTHESIA 1ST 15 MINUTES: Performed by: STUDENT IN AN ORGANIZED HEALTH CARE EDUCATION/TRAINING PROGRAM

## 2022-08-23 PROCEDURE — 94761 N-INVAS EAR/PLS OXIMETRY MLT: CPT

## 2022-08-23 RX ORDER — OXYCODONE HYDROCHLORIDE 5 MG/1
5 TABLET ORAL
Status: DISCONTINUED | OUTPATIENT
Start: 2022-08-23 | End: 2022-08-23

## 2022-08-23 RX ORDER — BUPIVACAINE HYDROCHLORIDE 2.5 MG/ML
INJECTION, SOLUTION EPIDURAL; INFILTRATION; INTRACAUDAL
Status: DISCONTINUED | OUTPATIENT
Start: 2022-08-23 | End: 2022-08-23 | Stop reason: HOSPADM

## 2022-08-23 RX ORDER — DIPHENHYDRAMINE HYDROCHLORIDE 50 MG/ML
12.5 INJECTION INTRAMUSCULAR; INTRAVENOUS EVERY 30 MIN PRN
Status: DISCONTINUED | OUTPATIENT
Start: 2022-08-23 | End: 2022-08-23

## 2022-08-23 RX ORDER — DIPHENHYDRAMINE HYDROCHLORIDE 50 MG/ML
INJECTION INTRAMUSCULAR; INTRAVENOUS
Status: DISCONTINUED | OUTPATIENT
Start: 2022-08-23 | End: 2022-08-23

## 2022-08-23 RX ORDER — ONDANSETRON 2 MG/ML
INJECTION INTRAMUSCULAR; INTRAVENOUS
Status: DISCONTINUED | OUTPATIENT
Start: 2022-08-23 | End: 2022-08-23

## 2022-08-23 RX ORDER — TALC
6 POWDER (GRAM) TOPICAL NIGHTLY PRN
Status: DISCONTINUED | OUTPATIENT
Start: 2022-08-23 | End: 2022-08-24 | Stop reason: HOSPADM

## 2022-08-23 RX ORDER — KETAMINE HCL IN 0.9 % NACL 50 MG/5 ML
SYRINGE (ML) INTRAVENOUS
Status: DISCONTINUED | OUTPATIENT
Start: 2022-08-23 | End: 2022-08-23

## 2022-08-23 RX ORDER — ACETAMINOPHEN 500 MG
1000 TABLET ORAL EVERY 8 HOURS
Status: DISCONTINUED | OUTPATIENT
Start: 2022-08-23 | End: 2022-08-24 | Stop reason: HOSPADM

## 2022-08-23 RX ORDER — SODIUM CHLORIDE, SODIUM LACTATE, POTASSIUM CHLORIDE, CALCIUM CHLORIDE 600; 310; 30; 20 MG/100ML; MG/100ML; MG/100ML; MG/100ML
INJECTION, SOLUTION INTRAVENOUS CONTINUOUS
Status: DISCONTINUED | OUTPATIENT
Start: 2022-08-23 | End: 2022-08-23

## 2022-08-23 RX ORDER — PROPOFOL 10 MG/ML
VIAL (ML) INTRAVENOUS
Status: DISCONTINUED | OUTPATIENT
Start: 2022-08-23 | End: 2022-08-23

## 2022-08-23 RX ORDER — PROCHLORPERAZINE EDISYLATE 5 MG/ML
5 INJECTION INTRAMUSCULAR; INTRAVENOUS EVERY 30 MIN PRN
Status: DISCONTINUED | OUTPATIENT
Start: 2022-08-23 | End: 2022-08-23

## 2022-08-23 RX ORDER — MIDAZOLAM HYDROCHLORIDE 1 MG/ML
INJECTION INTRAMUSCULAR; INTRAVENOUS
Status: DISCONTINUED | OUTPATIENT
Start: 2022-08-23 | End: 2022-08-23

## 2022-08-23 RX ORDER — EPHEDRINE SULFATE 50 MG/ML
INJECTION, SOLUTION INTRAVENOUS
Status: DISCONTINUED | OUTPATIENT
Start: 2022-08-23 | End: 2022-08-23

## 2022-08-23 RX ORDER — HYDROMORPHONE HYDROCHLORIDE 2 MG/ML
0.4 INJECTION, SOLUTION INTRAMUSCULAR; INTRAVENOUS; SUBCUTANEOUS EVERY 5 MIN PRN
Status: DISCONTINUED | OUTPATIENT
Start: 2022-08-23 | End: 2022-08-23

## 2022-08-23 RX ORDER — SODIUM CHLORIDE 0.9 % (FLUSH) 0.9 %
3 SYRINGE (ML) INJECTION
Status: DISCONTINUED | OUTPATIENT
Start: 2022-08-23 | End: 2022-08-24 | Stop reason: HOSPADM

## 2022-08-23 RX ORDER — ACETAMINOPHEN 500 MG
1000 TABLET ORAL
Status: COMPLETED | OUTPATIENT
Start: 2022-08-23 | End: 2022-08-23

## 2022-08-23 RX ORDER — MUPIROCIN 20 MG/G
1 OINTMENT TOPICAL 2 TIMES DAILY
Status: DISCONTINUED | OUTPATIENT
Start: 2022-08-23 | End: 2022-08-24 | Stop reason: HOSPADM

## 2022-08-23 RX ORDER — OXYCODONE HYDROCHLORIDE 5 MG/1
5 TABLET ORAL EVERY 4 HOURS PRN
Qty: 20 TABLET | Refills: 0 | Status: SHIPPED | OUTPATIENT
Start: 2022-08-23 | End: 2022-09-12 | Stop reason: ALTCHOICE

## 2022-08-23 RX ORDER — POLYETHYLENE GLYCOL 3350 17 G/17G
17 POWDER, FOR SOLUTION ORAL DAILY
Status: DISCONTINUED | OUTPATIENT
Start: 2022-08-23 | End: 2022-08-24 | Stop reason: HOSPADM

## 2022-08-23 RX ORDER — PHENYLEPHRINE HYDROCHLORIDE 10 MG/ML
INJECTION INTRAVENOUS
Status: DISCONTINUED | OUTPATIENT
Start: 2022-08-23 | End: 2022-08-23

## 2022-08-23 RX ORDER — BUPIVACAINE HYDROCHLORIDE 2.5 MG/ML
INJECTION, SOLUTION EPIDURAL; INFILTRATION; INTRACAUDAL
Status: COMPLETED | OUTPATIENT
Start: 2022-08-23 | End: 2022-08-23

## 2022-08-23 RX ORDER — OXYCODONE HYDROCHLORIDE 5 MG/1
10 TABLET ORAL EVERY 4 HOURS PRN
Status: DISCONTINUED | OUTPATIENT
Start: 2022-08-23 | End: 2022-08-24 | Stop reason: HOSPADM

## 2022-08-23 RX ORDER — SODIUM CHLORIDE 9 MG/ML
INJECTION, SOLUTION INTRAVENOUS CONTINUOUS
Status: DISCONTINUED | OUTPATIENT
Start: 2022-08-23 | End: 2022-08-24

## 2022-08-23 RX ORDER — LIDOCAINE HYDROCHLORIDE 10 MG/ML
1 INJECTION, SOLUTION EPIDURAL; INFILTRATION; INTRACAUDAL; PERINEURAL ONCE
Status: DISCONTINUED | OUTPATIENT
Start: 2022-08-23 | End: 2022-08-23

## 2022-08-23 RX ORDER — LIDOCAINE HCL/PF 100 MG/5ML
SYRINGE (ML) INTRAVENOUS
Status: DISCONTINUED | OUTPATIENT
Start: 2022-08-23 | End: 2022-08-23

## 2022-08-23 RX ORDER — DEXMEDETOMIDINE HYDROCHLORIDE 100 UG/ML
INJECTION, SOLUTION INTRAVENOUS
Status: DISCONTINUED | OUTPATIENT
Start: 2022-08-23 | End: 2022-08-23

## 2022-08-23 RX ORDER — ALBUMIN HUMAN 50 G/1000ML
SOLUTION INTRAVENOUS CONTINUOUS PRN
Status: DISCONTINUED | OUTPATIENT
Start: 2022-08-23 | End: 2022-08-23

## 2022-08-23 RX ORDER — METHOCARBAMOL 500 MG/1
500 TABLET, FILM COATED ORAL 3 TIMES DAILY
Status: DISCONTINUED | OUTPATIENT
Start: 2022-08-23 | End: 2022-08-24 | Stop reason: HOSPADM

## 2022-08-23 RX ORDER — HYDRALAZINE HYDROCHLORIDE 20 MG/ML
10 INJECTION INTRAMUSCULAR; INTRAVENOUS EVERY 6 HOURS PRN
Status: DISCONTINUED | OUTPATIENT
Start: 2022-08-23 | End: 2022-08-24 | Stop reason: HOSPADM

## 2022-08-23 RX ORDER — ROCURONIUM BROMIDE 10 MG/ML
INJECTION, SOLUTION INTRAVENOUS
Status: DISCONTINUED | OUTPATIENT
Start: 2022-08-23 | End: 2022-08-23

## 2022-08-23 RX ORDER — OXYCODONE HYDROCHLORIDE 5 MG/1
5 TABLET ORAL EVERY 4 HOURS PRN
Status: DISCONTINUED | OUTPATIENT
Start: 2022-08-23 | End: 2022-08-24 | Stop reason: HOSPADM

## 2022-08-23 RX ORDER — KETOROLAC TROMETHAMINE 30 MG/ML
15 INJECTION, SOLUTION INTRAMUSCULAR; INTRAVENOUS EVERY 8 HOURS
Status: DISCONTINUED | OUTPATIENT
Start: 2022-08-23 | End: 2022-08-24 | Stop reason: HOSPADM

## 2022-08-23 RX ORDER — ONDANSETRON 2 MG/ML
4 INJECTION INTRAMUSCULAR; INTRAVENOUS EVERY 12 HOURS PRN
Status: DISCONTINUED | OUTPATIENT
Start: 2022-08-23 | End: 2022-08-23

## 2022-08-23 RX ORDER — FENTANYL CITRATE 50 UG/ML
INJECTION, SOLUTION INTRAMUSCULAR; INTRAVENOUS
Status: DISCONTINUED | OUTPATIENT
Start: 2022-08-23 | End: 2022-08-23

## 2022-08-23 RX ORDER — CEFAZOLIN SODIUM 1 G/3ML
2 INJECTION, POWDER, FOR SOLUTION INTRAMUSCULAR; INTRAVENOUS
Status: COMPLETED | OUTPATIENT
Start: 2022-08-23 | End: 2022-08-23

## 2022-08-23 RX ORDER — LIDOCAINE HYDROCHLORIDE 10 MG/ML
0.5 INJECTION, SOLUTION EPIDURAL; INFILTRATION; INTRACAUDAL; PERINEURAL ONCE
Status: DISCONTINUED | OUTPATIENT
Start: 2022-08-23 | End: 2022-08-23

## 2022-08-23 RX ORDER — MEPERIDINE HYDROCHLORIDE 25 MG/ML
12.5 INJECTION INTRAMUSCULAR; INTRAVENOUS; SUBCUTANEOUS ONCE AS NEEDED
Status: DISCONTINUED | OUTPATIENT
Start: 2022-08-23 | End: 2022-08-23

## 2022-08-23 RX ORDER — SODIUM CHLORIDE 9 MG/ML
INJECTION, SOLUTION INTRAVENOUS CONTINUOUS
Status: DISCONTINUED | OUTPATIENT
Start: 2022-08-23 | End: 2022-08-23

## 2022-08-23 RX ADMIN — PHENYLEPHRINE HYDROCHLORIDE 100 MCG: 10 INJECTION INTRAVENOUS at 08:08

## 2022-08-23 RX ADMIN — ACETAMINOPHEN 1000 MG: 500 TABLET, FILM COATED ORAL at 09:08

## 2022-08-23 RX ADMIN — SODIUM CHLORIDE, SODIUM LACTATE, POTASSIUM CHLORIDE, AND CALCIUM CHLORIDE: .6; .31; .03; .02 INJECTION, SOLUTION INTRAVENOUS at 06:08

## 2022-08-23 RX ADMIN — SODIUM CHLORIDE: 0.9 INJECTION, SOLUTION INTRAVENOUS at 01:08

## 2022-08-23 RX ADMIN — ACETAMINOPHEN 1000 MG: 500 TABLET, FILM COATED ORAL at 05:08

## 2022-08-23 RX ADMIN — EPHEDRINE SULFATE 5 MG: 50 INJECTION INTRAVENOUS at 07:08

## 2022-08-23 RX ADMIN — ROCURONIUM BROMIDE 50 MG: 10 INJECTION, SOLUTION INTRAVENOUS at 07:08

## 2022-08-23 RX ADMIN — KETOROLAC TROMETHAMINE 15 MG: 30 INJECTION, SOLUTION INTRAMUSCULAR; INTRAVENOUS at 09:08

## 2022-08-23 RX ADMIN — OXYCODONE 10 MG: 5 TABLET ORAL at 06:08

## 2022-08-23 RX ADMIN — EPHEDRINE SULFATE 10 MG: 50 INJECTION INTRAVENOUS at 11:08

## 2022-08-23 RX ADMIN — DEXMEDETOMIDINE HYDROCHLORIDE 12 MCG: 100 INJECTION, SOLUTION, CONCENTRATE INTRAVENOUS at 07:08

## 2022-08-23 RX ADMIN — ROCURONIUM BROMIDE 30 MG: 10 INJECTION, SOLUTION INTRAVENOUS at 09:08

## 2022-08-23 RX ADMIN — METHOCARBAMOL 500 MG: 500 TABLET ORAL at 02:08

## 2022-08-23 RX ADMIN — CEFAZOLIN 2 G: 330 INJECTION, POWDER, FOR SOLUTION INTRAMUSCULAR; INTRAVENOUS at 07:08

## 2022-08-23 RX ADMIN — FENTANYL CITRATE 50 MCG: 50 INJECTION, SOLUTION INTRAMUSCULAR; INTRAVENOUS at 08:08

## 2022-08-23 RX ADMIN — BUPIVACAINE HYDROCHLORIDE 40 ML: 2.5 INJECTION, SOLUTION EPIDURAL; INFILTRATION; INTRACAUDAL; PERINEURAL at 06:08

## 2022-08-23 RX ADMIN — OXYCODONE 10 MG: 5 TABLET ORAL at 02:08

## 2022-08-23 RX ADMIN — SUGAMMADEX 226 MG: 100 INJECTION, SOLUTION INTRAVENOUS at 11:08

## 2022-08-23 RX ADMIN — FENTANYL CITRATE 50 MCG: 50 INJECTION, SOLUTION INTRAMUSCULAR; INTRAVENOUS at 11:08

## 2022-08-23 RX ADMIN — ACETAMINOPHEN 1000 MG: 500 TABLET, FILM COATED ORAL at 02:08

## 2022-08-23 RX ADMIN — SODIUM CHLORIDE, SODIUM LACTATE, POTASSIUM CHLORIDE, AND CALCIUM CHLORIDE: .6; .31; .03; .02 INJECTION, SOLUTION INTRAVENOUS at 12:08

## 2022-08-23 RX ADMIN — BUPIVACAINE 20 ML: 13.3 INJECTION, SUSPENSION, LIPOSOMAL INFILTRATION at 06:08

## 2022-08-23 RX ADMIN — SODIUM CHLORIDE, SODIUM LACTATE, POTASSIUM CHLORIDE, AND CALCIUM CHLORIDE: .6; .31; .03; .02 INJECTION, SOLUTION INTRAVENOUS at 07:08

## 2022-08-23 RX ADMIN — ALBUMIN (HUMAN): 12.5 SOLUTION INTRAVENOUS at 10:08

## 2022-08-23 RX ADMIN — FENTANYL CITRATE 100 MCG: 50 INJECTION, SOLUTION INTRAMUSCULAR; INTRAVENOUS at 11:08

## 2022-08-23 RX ADMIN — DEXMEDETOMIDINE HYDROCHLORIDE 4 MCG: 100 INJECTION, SOLUTION, CONCENTRATE INTRAVENOUS at 11:08

## 2022-08-23 RX ADMIN — DIPHENHYDRAMINE HYDROCHLORIDE 12.5 MG: 50 INJECTION, SOLUTION INTRAMUSCULAR; INTRAVENOUS at 07:08

## 2022-08-23 RX ADMIN — LIDOCAINE HYDROCHLORIDE 100 MG: 20 INJECTION, SOLUTION INTRAVENOUS at 07:08

## 2022-08-23 RX ADMIN — FENTANYL CITRATE 100 MCG: 50 INJECTION, SOLUTION INTRAMUSCULAR; INTRAVENOUS at 07:08

## 2022-08-23 RX ADMIN — PHENYLEPHRINE HYDROCHLORIDE 100 MCG: 10 INJECTION INTRAVENOUS at 11:08

## 2022-08-23 RX ADMIN — KETOROLAC TROMETHAMINE 15 MG: 30 INJECTION, SOLUTION INTRAMUSCULAR; INTRAVENOUS at 02:08

## 2022-08-23 RX ADMIN — ALBUMIN (HUMAN): 12.5 SOLUTION INTRAVENOUS at 08:08

## 2022-08-23 RX ADMIN — MUPIROCIN 1 G: 20 OINTMENT TOPICAL at 09:08

## 2022-08-23 RX ADMIN — PROPOFOL 50 MG: 10 INJECTION, EMULSION INTRAVENOUS at 11:08

## 2022-08-23 RX ADMIN — METHOCARBAMOL 500 MG: 500 TABLET ORAL at 09:08

## 2022-08-23 RX ADMIN — Medication 50 MG: at 07:08

## 2022-08-23 RX ADMIN — GLYCOPYRROLATE 0.2 MG: 0.2 INJECTION, SOLUTION INTRAMUSCULAR; INTRAVITREAL at 07:08

## 2022-08-23 RX ADMIN — ROCURONIUM BROMIDE 30 MG: 10 INJECTION, SOLUTION INTRAVENOUS at 10:08

## 2022-08-23 RX ADMIN — FENTANYL CITRATE 100 MCG: 50 INJECTION, SOLUTION INTRAMUSCULAR; INTRAVENOUS at 06:08

## 2022-08-23 RX ADMIN — OXYCODONE 5 MG: 5 TABLET ORAL at 12:08

## 2022-08-23 RX ADMIN — HYDRALAZINE HYDROCHLORIDE 10 MG: 20 INJECTION, SOLUTION INTRAMUSCULAR; INTRAVENOUS at 04:08

## 2022-08-23 RX ADMIN — PROPOFOL 200 MG: 10 INJECTION, EMULSION INTRAVENOUS at 07:08

## 2022-08-23 RX ADMIN — MIDAZOLAM HYDROCHLORIDE 2 MG: 1 INJECTION, SOLUTION INTRAMUSCULAR; INTRAVENOUS at 06:08

## 2022-08-23 RX ADMIN — ROCURONIUM BROMIDE 20 MG: 10 INJECTION, SOLUTION INTRAVENOUS at 08:08

## 2022-08-23 RX ADMIN — ONDANSETRON HYDROCHLORIDE 4 MG: 2 INJECTION INTRAMUSCULAR; INTRAVENOUS at 07:08

## 2022-08-23 RX ADMIN — CEFAZOLIN 2 G: 330 INJECTION, POWDER, FOR SOLUTION INTRAMUSCULAR; INTRAVENOUS at 11:08

## 2022-08-23 NOTE — DISCHARGE INSTRUCTIONS
Post-Operative Instructions: Adrenalectomy     Please refer to this sheet in the next few weeks. These discharge instructions provide you with general information on caring for yourself after you leave the hospital.     Pain medication  You should alternate between acetaminophen (Tylenol) and ibuprofen (Advil) every 3 hours for at least the first three days after surgery for pain control.  For example, take acetaminophen at 7am, then 3 hours later at 10am take ibuprofen, then 3 hours later at 1pm take acetaminophen and 3 hours later at 4pm take ibuprofen, etc...  You can take up to 1000 mg of acetaminophen every 6-8 hours.  Do not exceed 4000 mg per day.  You can take up to 800 mg ibuprofen (Advil) every 6-8 hours.  Narcotic medication (oxycodone) has been prescribed for more severe post-operative pain that is not relieved by acetaminophen and ibuprofen.  Cepacol lozenges: Use as needed for sore throat    Steroid medication  Hydrocortisone is being prescribed to you.  This is to help supplement your other adrenal gland until it starts functioning on its own.   Please take *** mg (*** tablets) in the morning between 6-9 am and then take *** mg (*** tablets) in the afternoon between 3-5 pm.  You will follow up with your endocrinologist,  *** to adjust this medication.  Please call this week to arrange for appropriate follow up.  Your endocrinologist may want to check your body's steroid production with tests.  If these tests are done, you may be instructed not to take the afternoon steroid dose prior to the tests.  Please continue to take these steroids as prescribed until it is adjusted or stopped by your endocrinologist.    Incision care  Your incisions are covered with ***.  This will remain on until your follow up visit with your surgeon.   Some bruising and swelling around the incisions is normal, especially around the larger incision.  The bruising and swelling will gradually go away.  You can use ice or heat  packs on your incisions if that helps with pain.  Use them for 30 minutes on, then 30 minutes off.    You may resume taking your normal medications, with the EXCEPTION of the following:  Please check with your surgeon and internist/cardiologist for specific instructions about when you should re-start:  Apixaban (Eliquis), Clopidogrel (Plavix), Dabigatran (Pradaxa), Dipyridamole (Aggrenox), Rivaroxaban (Xarelto), Warfarin (Coumadin), or any other type of blood thinner yo may be taking.  Aspirin & anti-inflammatories (i.e. Goody's, Excedrin, ibuprofen, Advil, Aleve): May begin 24 hours after surgery.  Vitamins, minerals, and herbal supplements:  Please wait 1 week after surgery to restart these medications.    Activity  You may shower after surgery.  No tub bathing or swimming (do not submerge in water) until cleared by your surgeon.   No driving any vehicle while on prescription pain medication, or until approved by your doctor.  No lifting or pulling more than 10 lbs until approved by your doctor.    Diet  You may resume your regular diet.  Be sure to take a stool softener for the first week or so after your surgery and while you are taking the narcotic pain medicine to avoid straining and keep your bowel movements regular.    For emergencies, difficulty breathing or shortness of breath,   please call 911, or report to the closest Emergency Department    Please notify your provider if you experience any of the following:  Bleeding, redness, warm to the touch, swelling, drainage from surgical incisions, bad smell from incision  Your pain level increases and does not improve with the pain medicines you have been given  Temperature greater than 101.5 F (38.1 C) degrees, chills  Inability to eat, drink fluids, or take medications  Nausea or vomiting  Dizziness or passing out  Develop a rash  Have pain or trouble urinating, or you pass blood in your urine  Develop a new cough  You are constipated or have diarrhea    If  you have any questions or concerns, please call the surgeon's office at [phone].      Future Appointments   Date Time Provider Department Center   9/8/2022 10:15 AM LAB, [facility] LABDRAW [facility]   9/8/2022 11:00 AM Rocio Dunlap MD [facility] END TIM [facility]

## 2022-08-23 NOTE — ANESTHESIA PROCEDURE NOTES
Intubation    Date/Time: 8/23/2022 7:07 AM  Performed by: Blossom Doan CRNA  Authorized by: Cesar Mclean MD     Intubation:     Induction:  Intravenous    Intubated:  Postinduction    Mask Ventilation:  Easy with oral airway    Attempts:  1    Method of Intubation:  Video laryngoscopy    Blade:  Redmond 4    Laryngeal View Grade: Grade I - full view of cords      Difficult Airway Encountered?: No      Complications:  None    Airway Device:  Oral endotracheal tube    Airway Device Size:  8.0    Style/Cuff Inflation:  Cuffed (inflated to minimal occlusive pressure)    Tube secured:  22    Secured at:  The lips    Placement Verified By:  Capnometry    Complicating Factors:  None    Findings Post-Intubation:  BS equal bilateral and atraumatic/condition of teeth unchanged

## 2022-08-23 NOTE — PLAN OF CARE
08/23/22 1305   Discharge Assessment   Assessment Type Discharge Planning Assessment   Confirmed/corrected address, phone number and insurance Yes   Confirmed Demographics Correct on Facesheet   Source of Information health record   Lives With significant other   Prior to hospitilization cognitive status: Alert/Oriented   Current cognitive status: Alert/Oriented   Walking or Climbing Stairs Difficulty none   Dressing/Bathing Difficulty none   Equipment Currently Used at Home none   Readmission within 30 days? No   Patient currently being followed by outpatient case management? No   Do you currently have service(s) that help you manage your care at home? No   Do you take prescription medications? Yes   Do you have prescription coverage? Yes   Do you have any problems affording any of your prescribed medications? No   Is the patient taking medications as prescribed? yes   How do you get to doctors appointments? car, drives self;family or friend will provide   Are you on dialysis? No   Do you take coumadin? No   Discharge Plan A Home with family   Discharge Plan B Home Health   Discharge Plan discussed with: Patient   Discharge Barriers Identified None      DISPLAY PLAN FREE TEXT

## 2022-08-23 NOTE — OP NOTE
[facility] ([place])  Endocrine Surgery  Operative Note         Date of Procedure: 8/23/2022     Procedure:   Procedure(s) (LRB):  XI ROBOTIC ADRENALECTOMY (Right)    Surgeon(s):  Surgeon(s) and Role:     * Rocio Dunlap MD - Primary  Assisting Surgeon: Lidia Soto MD (PGY-4)    Pre-Operative Diagnosis:   Right adrenal mass [E27.8]    Post-Operative Diagnosis:   Post-Op Diagnosis Codes:     * Right adrenal mass [E27.8]    Anesthesia:   General    Procedures:   1. Right laparoscopic assisted robotic adrenalectomy    Operative Findings: Right adrenal mass, no direct invasion into surrounding structures, small deposits of adrenal tissue adjacent to adrenal gland and adrenal tumor    Indications:   Right adrenal mass measuring approximately 4.8 cm with interval growth, risk for adrenal cortical carcinoma based on size    Operative Details:  Patient was brought to the operating room, the patient and procedure were confirmed, general anesthesia was established.  A hernandez catheter was placed.  Appropriate lines were placed by anesthesia.  The patient was positioned in a partial lateral decubitus position with the right side up, the bed was flexed and the arms and pressure points were appropriately padded.     The operative field was prepped and draped in sterile fashion.  A timeout was performed.  Anatomical landmarks were marked and entry into the abdominal cavity was gained using a 0-degree laparoscope and a 5 mm optical trochar in the right subcostal space and pneumoperitoneum was established with CO2.  Two 8 mm robotic trochars were placed laterally along the subcostal margin under direct visualization. A 12 mm robotic trochar was placed in the epigastrium, the initial 5 mm trochar was exchanged for an 8 mm robotic trochar and a 5 mm air seal assistant port was placed in the right periumbilical region under direct visualization.  The 25eighti Xi robot was docked and targeted to the right upper quadrant.   The peritoneal cavity and liver surface were inspected and no gross metastatic lesions or entry injuries were noted.    The lateral liver attachments and peritoneal reflection along the the posterior surface of the liver were divided with the robotic vessel sealer and the liver was retracted superiorly to expose the retroperitoneum.   The IVC was identified and carefully exposed along the anterior surface.  The retroperitoneum was dissected open to expose the superior pole of the kidney.  The retroperitoneal fat was dissected off the superior pole of the kidney and medially to sweep the renal hilum down.  The lateral attachments of the hepatic flexure and duodenum were released to improve exposure.  The fatty retroperitoneal contents superior to the renal artery were dissected off the posterior abdominal musculature and the inferior vena cava.   Dissection was then continued along the anterolateral aspect of the inferior vena cava superiorly until the adrenal vein was clearly identified.  The adrenal vein was doubly sealed and divided with the vessel sealer after directly communicating with the anesthesia team.  The adrenal gland and tumor were carefully dissected free taking care to preserve the tumor capsule.  There were two small deposits of adrenal tissue that appeared to be separate from the adrenal gland and were included en bloc with the specimen.  The entire contents of the adrenal bed were dissected off of the posterior abdominal wall, superior pole of the kidney, underside of the liver and diaphragm with the vessel sealer and delivered into a specimen bag.     The adrenal specimen was extracted via the epigastric port site within the specimen bag.  The fascia of the epigastric incision was closed with interrupted figure-of-eight 0 Vicryl sutures using the Hugo-Rama and the remaining trocars removed under direct visualization.  The dermis of the epigastric incision was approximated with 3-0 Vicryl and  the skin was closed with 4-0 Monocryl, the remaining incisions were closed with subcuticular 4-0 Vicryl.  Sterile surgical glue was applied to the incisions.    The Martin catheter was removed at the completion of the case.  A debriefing was performed and pathology specimens were confirmed.  Sponge, needle and instrument counts were reported correct at the end of the case.      Estimated Blood Loss (EBL): * No values recorded between 8/23/2022  7:47 AM and 8/23/2022 11:30 AM *  25 mL    Specimens:   Specimen (24h ago, onward)             Start     Ordered    08/23/22 1153  Specimen to Pathology, Surgery General Surgery  Once        Comments: Pre-op Diagnosis: Right adrenal mass [E27.8]Procedure(s):XI ROBOTIC ADRENALECTOMY Number of specimens: 1Name of specimens: 1. Right Adrenal Gland with tumor, Loops shaila question adrenal tissue deposits     References:    Click here for ordering Quick Tip   Question Answer Comment   Procedure Type: General Surgery    Specimen Class: Known or suspected malignancy    Which provider would you like to cc? SHILPA FLORES    Release to patient Immediate        08/23/22 1203                        Condition: Good    Disposition: PACU - hemodynamically stable.    Attestation: I was present and scrubbed for the entire procedure.

## 2022-08-23 NOTE — TRANSFER OF CARE
"Anesthesia Transfer of Care Note    Patient: Tomas Hong    Procedure(s) Performed: Procedure(s) (LRB):  XI ROBOTIC ADRENALECTOMY (Right)    Patient location: PACU    Anesthesia Type: general    Transport from OR: Transported from OR on 6-10 L/min O2 by face mask with adequate spontaneous ventilation    Post pain: adequate analgesia    Post assessment: no apparent anesthetic complications    Post vital signs: stable    Level of consciousness: awake and alert    Nausea/Vomiting: no nausea/vomiting    Complications: none    Transfer of care protocol was followed      Last vitals:   Visit Vitals  BP (!) 159/78 (BP Location: Left arm, Patient Position: Sitting)   Pulse 64   Temp 36.7 °C (98 °F) (Oral)   Resp 16   Ht 6' 3" (1.905 m)   Wt 112.9 kg (249 lb)   SpO2 99%   BMI 31.12 kg/m²     "

## 2022-08-23 NOTE — H&P
The patient has been examined and the H&P has been reviewed:  I concur with the findings and no changes have occurred since H&P was written.    Anesthesia/Surgery risks, benefits and alternative options discussed and understood by patient/family.    Lidia Soto MD  General Surgery, PGY-4  [phone]       Endocrine Surgery History & Physical      REFERRING PROVIDER: Self, Aaareferral - Will be establishing care with Dr. Best (Endocrinology)     REASON FOR VISIT: Adrenal mass, right     HPI: Tomas Hong is a 56 y.o. male patient with a history notable for tobacco use, carpal tunnel syndrome, hypertension, seasonal allergies and ulcerative colitis who presents in consultation for management of an incidental right adrenal mass.   An adrenal mass was noted incidentally on CT scan in 2014 done for diffuse abdominal pain. Radiology imaging documented a right 4 x 2.7 cm adrenal adenoma with HU measuring 8.  More detailed adrenal protocol radiologic imaging was not performed.  A CT scan was done in 4/2022 at [facility] that is available for review and demonstrates interval growth to approximately 4.8 cm.     With respect to symptoms concerning for hyperaldosteronism, he does have hypertension hypertension, currently he is on lisinopril.  He does not have hypokalemia.       With respect to symptoms concerning for hypercortisolism, he denies diabetes, glucose intolerance, significant weight gain, facial plethora, thoracodorsal fat pad, acne, abnormal hair growth, central/truncal obesity, violaceous striae, poor wound healing, proximal muscle weakness, or Cushingoid features.       With respect to symptoms related to pheochromocytoma, he reports intermittent dizzy spells, he denies headaches, palpitations, tachycardia, tremors, skin flushing, anxiety attacks, weight loss, sleep disturbances, sweating.  He denies significant flank or back pain.  The patient is not currently on tricyclic  antidepressants, decongestants, amphetamines, reserpine or phenoxybenzamine.     The patient denies personal or family history of endocrinopathies, endocrine cancer, thyroid cancer, medullary thyroid cancer, hyperparathyroidism, hypercalcemia, pheochromocytomas, paraganglioma, or other neuroendocrine tumors.         LABORATORY STUDIES:  I personally and independently reviewed relevant lab test results, including the following:     Component      Latest Ref Rng & Units 7/28/2022 7/21/2022   Metanephrine, Free      < OR = 57 pg/mL   47   Metanephrine, Total, Plasma      < OR = 205 pg/mL   100   Normetanephrine, Free      < OR = 148 pg/mL   53   ALDOSTERONE      ng/dL   5.1   Renin Activity      ng/mL/h   3.4   ACTH      0 - 46 pg/mL 7     Cortisol, 8 AM      4.30 - 22.40 ug/dL 1.10 (L)          PAST MEDICAL HISTORY:      Patient Active Problem List   Diagnosis    Right adrenal mass    Hypertension    Ulcerative colitis         PAST SURGICAL HISTORY:        Past Surgical History:   Procedure Laterality Date    CARPAL TUNNEL RELEASE Left 05/01/2022     Carpel tunnel surgery left    JOINT REPLACEMENT Left 05/01/2016     Left Hip replacement         MEDICATIONS:  Current Medications          Current Outpatient Medications   Medication Sig Dispense Refill    fluticasone propionate (FLONASE) 50 mcg/actuation nasal spray 1 spray by Nasal route once daily at 6am.        ibuprofen (ADVIL,MOTRIN) 800 MG tablet Take 800 mg by mouth once daily at 6am.        ibuprofen 800 mg/200 mL (4 mg/mL) PgBk          lisinopriL 10 MG tablet Take 10 mg by mouth once daily.        traMADoL (ULTRAM) 50 mg tablet Take 50 mg by mouth every 6 (six) hours as needed for Pain. 3 times/ weekly        varenicline (CHANTIX) 1 mg Tab Take 1 mg by mouth 2 (two) times daily.          No current facility-administered medications for this visit.            ALLERGIES:  Review of patient's allergies indicates:  No Known Allergies     SOCIAL  "HISTORY:  Social History               Socioeconomic History    Marital status:    Occupational History    Occupation: disabled   Tobacco Use    Smoking status: Current Every Day Smoker       Packs/day: 0.25       Years: 40.00       Pack years: 10.00       Types: Cigarettes    Smokeless tobacco: Never Used    Tobacco comment: 2 packs  cig/ 40 years- as of 7/21/2022 5 cigs/day   Substance and Sexual Activity    Alcohol use: No    Drug use: Never    Sexual activity: Yes       Partners: Female             FAMILY HISTORY:  History reviewed. No pertinent family history.       REVIEW OF SYSTEMS:  A detailed review of systems was reviewed with the patient, pertinent positives and negatives are presented in the note and is otherwise negative.     PHYSICAL EXAMINATION:  Vital Signs: BP (!) 149/84 (BP Location: Right arm, Patient Position: Sitting, BP Method: Medium (Automatic))   Pulse 62   Temp 98.8 °F (37.1 °C) (Oral)   Resp 18   Ht 6' 3" (1.905 m)   Wt 113.4 kg (250 lb)   SpO2 98%   BMI 31.25 kg/m²      Constitutional: no acute distress, comfortable, well appearing, no hirsutism  HENT: no scleral icterus, conjunctiva are clear, no supraclavicular/dorsocervical fat pad, no facial plethora  Neck: supple, trachea in midline  Heme/Lymph: no cervical or supraclavicular lymphadenopathy  Respiratory: normal respiratory effort, no wheezes or stridor  Cardiovascular: regular rate and rhythm  Abdomen: soft, non distended, no scars, no hernias, no central obesity  Extremities: no edema  Skin: warm and dry, no rashes, no violacious striae, no hyperpigmentation, no acne  Neurologic: no resting tremor of outstretched hands, voice adequate, hip flexion 5/5 bilaterally, no gross focal defects  Vascular: radial pulses palpable bilaterally  Psychiatric: affect normal        IMAGING STUDIES:  I personally and independently reviewed, visualized and interpreted the images of the below listed radiology studies (including " non contrasted limited CT from 4/22/22 done at [facility] and prior CT done in 2014) my findings are notable for a lipid rich right adrenal mass measuring over 4.4cm with interval growth since 2014.  Adrenal protocol CT pending.     IMPRESSION:  I had the pleasure of seeing Mr. Hong in endocrine surgical consultation regarding the >4cm right adrenal mass.  I discussed the implications and treatment of incidentally noted adrenal masses.  The majority of these are benign non-functional tumors.  I have recommended further laboratory evaluation to rule out a functional tumor and dedicated adrenal protocol CT scan.     I have discussed with Mr. Hong at length regarding the current surgical and non-surgical treatment options.  Based on the current clinical findings and patient's preference he will necessitate a right laparoscopic assisted robotic adrenalectomy.     We discussed the risks and benefits of adrenalectomy, specifically the risk of significant bleeding, scarring, infection, hernia, injury to adjacent organs (notably the liver, duodenum, colon and kidney) necessitating additional procedures or drains, anesthesia risks, death and stroke.  We also discussed the risk of postoperative adrenal insufficiency and the potential need for steroid replacement temporarily or permanently after surgery.     The patient expressed understanding of all the risks at hand, agreed with this plan and informed consent was signed and witnessed.  The patient has my contact information, and understands to call me with any additional questions or concerns in the interval.             Problem List Items Addressed This Visit                 Endocrine      Right adrenal mass - Primary      Relevant Orders      CBC Auto Differential (Completed)      Comprehensive Metabolic Panel (Completed)      Metanephrines, Plasma Free (Completed)      Aldosterone (Completed)      Renin (Completed)      ACTH (Completed)      Cortisol, 8AM  (Completed)                Other Visit Diagnoses      Other specified disorders of adrenal gland         Relevant Orders     CT Abdomen With Without Contrast          Rocio Dunlap MD  Endocrine Surgery   7/21/22

## 2022-08-23 NOTE — HPI
Tomas Hong is a 56 y.o. male patient with a history notable for tobacco use, carpal tunnel syndrome, hypertension, seasonal allergies and ulcerative colitis who presented for consultation for management of an incidental right adrenal mass. This was initially noted incidentally on CT scan in 2014 done for diffuse abdominal pain. Radiology imaging documented a right 4 x 2.7 cm adrenal adenoma with HU measuring 8. More detailed adrenal protocol radiologic imaging was not performed. A CT scan was done in 4/2022 at [facility] that is available for review and demonstrates interval growth to approximately 4.8 cm. Biochemical work up showed this was a likely a non-functional tumor; however, given the growth to >4 cm, we recommended excision due to increased risk of malignancy. After discussion with the patient, the decision was made to proceed with robotic assisted right adrenalectomy.

## 2022-08-23 NOTE — ANESTHESIA PROCEDURE NOTES
Peripheral Block    Patient location during procedure: holding area   Block not for primary anesthetic.  Reason for block: at surgeon's request and post-op pain management   Post-op Pain Location: adrenalectomy   Start time: 8/23/2022 6:36 AM  Timeout: 8/23/2022 6:36 AM     Staffing  Authorizing Provider: Cesar Mclean MD  Performing Provider: Cesar Mclean MD    Preanesthetic Checklist  Completed: patient identified, IV checked, site marked, risks and benefits discussed, surgical consent, monitors and equipment checked, pre-op evaluation and timeout performed  Peripheral Block  Patient position: supine  Prep: ChloraPrep  Patient monitoring: heart rate, cardiac monitor, continuous pulse ox and frequent blood pressure checks  Block type: TAP  Laterality: right  Injection technique: single shot  Needle  Needle gauge: 22 G  Needle length: 3.5 in  Needle localization: ultrasound guidance   -ultrasound image captured on disc.  Assessment  Injection assessment: negative aspiration, negative parasthesia and local visualized surrounding nerve  Paresthesia pain: none  Heart rate change: no  Slow fractionated injection: yes  Pain Tolerance: comfortable throughout block and no complaints  Medications:    Medications: bupivacaine (pf) (MARCAINE) injection 0.25% - Perineural   40 mL - 8/23/2022 6:42:00 AM    Additional Notes  Tap and subcostal done with bupiv 0.25% 20 cc plus exparel 10 cc to each

## 2022-08-23 NOTE — ANESTHESIA POSTPROCEDURE EVALUATION
Anesthesia Post Evaluation    Patient: Tomas Hong    Procedure(s) Performed: Procedure(s) (LRB):  XI ROBOTIC ADRENALECTOMY (Right)    Final Anesthesia Type: general      Patient location during evaluation: PACU  Patient participation: Yes- Able to Participate  Level of consciousness: awake and alert  Post-procedure vital signs: reviewed and stable  Pain management: adequate  Airway patency: patent    PONV status at discharge: No PONV  Anesthetic complications: no      Cardiovascular status: blood pressure returned to baseline  Respiratory status: unassisted  Hydration status: euvolemic  Follow-up not needed.          Vitals Value Taken Time   /71 08/23/22 1315   Temp 36.5 °C (97.7 °F) 08/23/22 1315   Pulse 64 08/23/22 1324   Resp 18 08/23/22 1245   SpO2 93 % 08/23/22 1324   Vitals shown include unvalidated device data.      No case tracking events are documented in the log.      Pain/Zechariah Score: Pain Rating Prior to Med Admin: 4 (8/23/2022 12:45 PM)  Zechariah Score: 10 (8/23/2022 12:53 PM)

## 2022-08-24 VITALS
WEIGHT: 249 LBS | SYSTOLIC BLOOD PRESSURE: 157 MMHG | BODY MASS INDEX: 30.96 KG/M2 | OXYGEN SATURATION: 96 % | RESPIRATION RATE: 18 BRPM | DIASTOLIC BLOOD PRESSURE: 73 MMHG | TEMPERATURE: 98 F | HEIGHT: 75 IN | HEART RATE: 58 BPM

## 2022-08-24 LAB — CORTIS SERPL-MCNC: 8 UG/DL

## 2022-08-24 PROCEDURE — 82533 TOTAL CORTISOL: CPT | Performed by: STUDENT IN AN ORGANIZED HEALTH CARE EDUCATION/TRAINING PROGRAM

## 2022-08-24 PROCEDURE — 25000003 PHARM REV CODE 250: Performed by: STUDENT IN AN ORGANIZED HEALTH CARE EDUCATION/TRAINING PROGRAM

## 2022-08-24 PROCEDURE — 63600175 PHARM REV CODE 636 W HCPCS: Performed by: STUDENT IN AN ORGANIZED HEALTH CARE EDUCATION/TRAINING PROGRAM

## 2022-08-24 PROCEDURE — 82024 ASSAY OF ACTH: CPT | Performed by: STUDENT IN AN ORGANIZED HEALTH CARE EDUCATION/TRAINING PROGRAM

## 2022-08-24 PROCEDURE — 94761 N-INVAS EAR/PLS OXIMETRY MLT: CPT

## 2022-08-24 PROCEDURE — 94799 UNLISTED PULMONARY SVC/PX: CPT

## 2022-08-24 RX ADMIN — POLYETHYLENE GLYCOL 3350 17 G: 17 POWDER, FOR SOLUTION ORAL at 08:08

## 2022-08-24 RX ADMIN — METHOCARBAMOL 500 MG: 500 TABLET ORAL at 08:08

## 2022-08-24 RX ADMIN — SODIUM CHLORIDE: 0.9 INJECTION, SOLUTION INTRAVENOUS at 06:08

## 2022-08-24 RX ADMIN — OXYCODONE 10 MG: 5 TABLET ORAL at 04:08

## 2022-08-24 RX ADMIN — OXYCODONE 10 MG: 5 TABLET ORAL at 10:08

## 2022-08-24 RX ADMIN — KETOROLAC TROMETHAMINE 15 MG: 30 INJECTION, SOLUTION INTRAMUSCULAR; INTRAVENOUS at 05:08

## 2022-08-24 RX ADMIN — ACETAMINOPHEN 1000 MG: 500 TABLET, FILM COATED ORAL at 05:08

## 2022-08-24 NOTE — PATIENT INSTRUCTIONS
Post-Operative Instructions: Adrenalectomy     Please refer to this sheet in the next few weeks. These discharge instructions provide you with general information on caring for yourself after you leave the hospital.     Pain medication  You should alternate between acetaminophen (Tylenol) and ibuprofen (Advil) every 3 hours for at least the first three days after surgery for pain control.  For example, take acetaminophen at 7am, then 3 hours later at 10am take ibuprofen, then 3 hours later at 1pm take acetaminophen and 3 hours later at 4pm take ibuprofen, etc...  You can take up to 1000 mg of acetaminophen every 6-8 hours.  Do not exceed 4000 mg per day.  You can take up to 800 mg ibuprofen (Advil) every 6-8 hours.  Narcotic medication (oxycodone) has been prescribed for more severe post-operative pain that is not relieved by acetaminophen and ibuprofen.    Incision care  Your incisions are covered with dermabond.  This will remain on until your follow up visit with your surgeon.   Some bruising and swelling around the incisions is normal, especially around the larger incision.  The bruising and swelling will gradually go away.  You can use ice or heat packs on your incisions if that helps with pain.  Use them for 30 minutes on, then 30 minutes off.    You may resume taking your normal medications, with the EXCEPTION of the following:  Please check with your surgeon and internist/cardiologist for specific instructions about when you should re-start:  Apixaban (Eliquis), Clopidogrel (Plavix), Dabigatran (Pradaxa), Dipyridamole (Aggrenox), Rivaroxaban (Xarelto), Warfarin (Coumadin), or any other type of blood thinner yo may be taking.  Aspirin & anti-inflammatories (i.e. Goody's, Excedrin, ibuprofen, Advil, Aleve): May begin 24 hours after surgery.  Vitamins, minerals, and herbal supplements:  Please wait 1 week after surgery to restart these medications.    Activity  You may shower now.  No tub bathing or swimming  (do not submerge in water) until cleared by your surgeon.   No driving any vehicle while on prescription pain medication, or until approved by your doctor.  No lifting or pulling more than 10 lbs until approved by your doctor.    Diet  You may resume your regular diet.  Be sure to take a stool softener for the first week or so after your surgery and while you are taking the narcotic pain medicine to avoid straining and keep your bowel movements regular.    For emergencies, difficulty breathing or shortness of breath,   please call 911, or report to the closest Emergency Department    Please notify your provider if you experience any of the following:  Bleeding, redness, warm to the touch, swelling, drainage from surgical incisions, bad smell from incision  Your pain level increases and does not improve with the pain medicines you have been given  Temperature greater than 101.5 F (38.1 C) degrees, chills  Inability to eat, drink fluids, or take medications  Nausea or vomiting  Dizziness or passing out  Develop a rash  Have pain or trouble urinating, or you pass blood in your urine  Develop a new cough  You are constipated or have diarrhea    If you have any questions or concerns, please call the surgeon's office at [phone].      Future Appointments   Date Time Provider Department Center   9/8/2022 10:15 AM LAB, [facility]   9/8/2022 11:00 AM Rocio Dunlap MD [facility] END TIM [facility]

## 2022-08-24 NOTE — DISCHARGE SUMMARY
[facility]  General Surgery  Discharge Summary      Patient Name: Tomas Hong  MRN: [id number]  Admission Date: 8/23/2022  Hospital Length of Stay: 1 days  Discharge Date and Time:  08/24/2022 5:09 PM  Attending Physician: Sandra att. providers found   Discharging Provider: Lidia Soto MD  Primary Care Provider: Ann Trevino MD (Inactive)    HPI:   Tomas Hong is a 56 y.o. male patient with a history notable for tobacco use, carpal tunnel syndrome, hypertension, seasonal allergies and ulcerative colitis who presented for consultation for management of an incidental right adrenal mass. This was initially noted incidentally on CT scan in 2014 done for diffuse abdominal pain. Radiology imaging documented a right 4 x 2.7 cm adrenal adenoma with HU measuring 8. More detailed adrenal protocol radiologic imaging was not performed. A CT scan was done in 4/2022 at [facility] that is available for review and demonstrates interval growth to approximately 4.8 cm. Biochemical work up showed this was a likely a non-functional tumor; however, given the growth to >4 cm, we recommended excision due to increased risk of malignancy. After discussion with the patient, the decision was made to proceed with robotic assisted right adrenalectomy.       Procedure(s) (LRB):  XI ROBOTIC ADRENALECTOMY (Right)      Indwelling Lines/Drains at time of discharge:   Lines/Drains/Airways     None               Hospital Course: Mr. Hong presented for the above procedure. He tolerated this well and was transferred to PACU for recovery from anesthesia before being moved to the floor for further observation. His post op course was uncomplicated and he was discharged on POD#1. At that time, he was tolerating a regular diet, ambulating, voiding spontaneously, and had adequate pain control. Discharge instructions were provided along with a recommended time for follow up.      Physical Exam  Vitals and nursing note  reviewed.   Constitutional:       General: He is not in acute distress.     Appearance: He is not diaphoretic.   HENT:      Mouth/Throat:      Pharynx: No oropharyngeal exudate.   Eyes:      General: No scleral icterus.  Neck:      Trachea: No tracheal deviation.   Cardiovascular:      Rate and Rhythm: Normal rate and regular rhythm.   Pulmonary:      Effort: Pulmonary effort is normal. No respiratory distress.   Abdominal:      Comments: Lap incisions with dermabond in place  Clean, dry, and intact.   Abdomen is soft, non-distended, and appropriately tender   Musculoskeletal:         General: Normal range of motion.      Cervical back: Normal range of motion.   Skin:     General: Skin is warm and dry.   Neurological:      Mental Status: He is alert.      Cranial Nerves: No cranial nerve deficit.   Psychiatric:         Mood and Affect: Affect normal.           Goals of Care Treatment Preferences:  Code Status: Full Code      Consults:     Significant Diagnostic Studies: Labs:   CMP   Recent Labs   Lab 08/23/22  0523     137   K 4.3  4.3     107   CO2 21*  21*   GLU 75  75   BUN 18  18   CREATININE 1.1  1.1   CALCIUM 9.4  9.4   PROT 7.2   ALBUMIN 4.2  4.2   BILITOT 0.4   ALKPHOS 65   AST 28   ALT 34   ANIONGAP 9  9    and CBC   Recent Labs   Lab 08/23/22  0523   WBC 6.97   HGB 15.0   HCT 43.4          Pending Diagnostic Studies:     Procedure Component Value Units Date/Time    ACTH [[id number]] Collected: 08/24/22 0753    Order Status: Sent Lab Status: In process Updated: 08/24/22 1104    Specimen: Blood     Specimen to Pathology, Surgery General Surgery [[id number]] Collected: 08/23/22 1203    Order Status: Sent Lab Status: In process Updated: 08/23/22 1524    Specimen: Tissue         Final Active Diagnoses:    Diagnosis Date Noted POA    PRINCIPAL PROBLEM:  Right adrenal mass [E27.8] 07/21/2022 Yes      Problems Resolved During this Admission:      Discharged Condition:  good    Disposition: Home or Self Care    Follow Up:    Patient Instructions:      Notify your health care provider if you experience any of the following:  temperature >100.4     Notify your health care provider if you experience any of the following:  persistent nausea and vomiting or diarrhea     Notify your health care provider if you experience any of the following:  severe uncontrolled pain     Notify your health care provider if you experience any of the following:  redness, tenderness, or signs of infection (pain, swelling, redness, odor or green/yellow discharge around incision site)     Notify your health care provider if you experience any of the following:  difficulty breathing or increased cough     Notify your health care provider if you experience any of the following:  severe persistent headache     Notify your health care provider if you experience any of the following:  worsening rash     Notify your health care provider if you experience any of the following:  persistent dizziness, light-headedness, or visual disturbances     Notify your health care provider if you experience any of the following:  increased confusion or weakness     Lifting restrictions   Order Comments: Do not lift anything >10 lbs (about a gallon of milk) for 4 weeks     No driving until:   Order Comments: No longer taking narcotic pain meds     No dressing needed   Order Comments: Okay to shower Please do not soak in water such as a bath, hot tub, or pool for 2 weeks.  Your incision is covered with surgical glue (dermabond). When showering, allow soapy water to run over the incision and then pat dry. Do not scrub or pick at the glue. It will fall off on its own over the next 1-2 weeks.     Medications:  Reconciled Home Medications:      Medication List      START taking these medications    oxyCODONE 5 MG immediate release tablet  Commonly known as: ROXICODONE  Take 1 tablet (5 mg total) by mouth every 4 (four) hours as needed  for Pain.        CONTINUE taking these medications    fluticasone propionate 50 mcg/actuation nasal spray  Commonly known as: FLONASE  1 spray by Nasal route once daily at 6am.     ibuprofen 800 MG tablet  Commonly known as: ADVIL,MOTRIN  Take 800 mg by mouth once daily at 6am.     lisinopriL 10 MG tablet  Take 10 mg by mouth once daily.     varenicline 1 mg Tab  Commonly known as: CHANTIX  Take 1 mg by mouth 2 (two) times daily.        STOP taking these medications    traMADoL 50 mg tablet  Commonly known as: ULTRAM          Time spent on the discharge of patient: 10 minutes    Lidia Soto MD  General Surgery  [facility] ([facility])

## 2022-08-24 NOTE — HOSPITAL COURSE
Mr. Hong presented for the above procedure. He tolerated this well and was transferred to PACU for recovery from anesthesia before being moved to the floor for further observation. His post op course was uncomplicated and he was discharged on POD#1. At that time, he was tolerating a regular diet, ambulating, voiding spontaneously, and had adequate pain control. Discharge instructions were provided along with a recommended time for follow up.

## 2022-08-24 NOTE — PLAN OF CARE
Problem: Adult Inpatient Plan of Care  Goal: Plan of Care Review  Outcome: Ongoing, Progressing  Goal: Patient-Specific Goal (Individualized)  Outcome: Ongoing, Progressing  Goal: Absence of Hospital-Acquired Illness or Injury  Outcome: Ongoing, Progressing  Goal: Optimal Comfort and Wellbeing  Outcome: Ongoing, Progressing  Goal: Readiness for Transition of Care  Outcome: Ongoing, Progressing     Pt AAOx4, ambulates independently.  Pt reports mild-moderate pain at shift start; PRN oxycodone 10mg given once.  VS stable on RA.  Wife remains at bedside.  NS running at 75.  No falls or injuries, no major events.  Will continue to monitor.

## 2022-08-24 NOTE — PLAN OF CARE
08/24/22 1310   Final Note   Assessment Type Final Discharge Note   Anticipated Discharge Disposition Home   Hospital Resources/Appts/Education Provided Provided patient/caregiver with written discharge plan information;Appointments scheduled and added to AVS   Post-Acute Status   Discharge Delays None known at this time

## 2022-08-25 ENCOUNTER — PATIENT MESSAGE (OUTPATIENT)
Dept: SURGERY | Facility: CLINIC | Age: 57
End: 2022-08-25
Payer: MEDICARE

## 2022-08-26 LAB — ACTH PLAS-MCNC: 19 PG/ML (ref 0–46)

## 2022-09-01 ENCOUNTER — PATIENT MESSAGE (OUTPATIENT)
Dept: SURGERY | Facility: CLINIC | Age: 57
End: 2022-09-01
Payer: MEDICARE

## 2022-09-02 LAB
COMMENT: NORMAL
FINAL PATHOLOGIC DIAGNOSIS: NORMAL
GROSS: NORMAL
Lab: NORMAL

## 2022-09-08 ENCOUNTER — LAB VISIT (OUTPATIENT)
Dept: LAB | Facility: OTHER | Age: 57
End: 2022-09-08
Attending: STUDENT IN AN ORGANIZED HEALTH CARE EDUCATION/TRAINING PROGRAM
Payer: MEDICARE

## 2022-09-08 ENCOUNTER — PATIENT MESSAGE (OUTPATIENT)
Dept: SURGERY | Facility: CLINIC | Age: 57
End: 2022-09-08

## 2022-09-08 DIAGNOSIS — E27.8 RIGHT ADRENAL MASS: ICD-10-CM

## 2022-09-08 LAB
ALBUMIN SERPL BCP-MCNC: 4.2 G/DL (ref 3.5–5.2)
ANION GAP SERPL CALC-SCNC: 7 MMOL/L (ref 8–16)
BUN SERPL-MCNC: 18 MG/DL (ref 6–20)
CALCIUM SERPL-MCNC: 9.8 MG/DL (ref 8.7–10.5)
CHLORIDE SERPL-SCNC: 105 MMOL/L (ref 95–110)
CO2 SERPL-SCNC: 26 MMOL/L (ref 23–29)
CREAT SERPL-MCNC: 1.3 MG/DL (ref 0.5–1.4)
EST. GFR  (NO RACE VARIABLE): >60 ML/MIN/1.73 M^2
GLUCOSE SERPL-MCNC: 68 MG/DL (ref 70–110)
PHOSPHATE SERPL-MCNC: 3.4 MG/DL (ref 2.7–4.5)
POTASSIUM SERPL-SCNC: 5 MMOL/L (ref 3.5–5.1)
SODIUM SERPL-SCNC: 138 MMOL/L (ref 136–145)

## 2022-09-08 PROCEDURE — 36415 COLL VENOUS BLD VENIPUNCTURE: CPT | Performed by: STUDENT IN AN ORGANIZED HEALTH CARE EDUCATION/TRAINING PROGRAM

## 2022-09-08 PROCEDURE — 80069 RENAL FUNCTION PANEL: CPT | Performed by: STUDENT IN AN ORGANIZED HEALTH CARE EDUCATION/TRAINING PROGRAM

## 2022-09-11 NOTE — PROGRESS NOTES
Postoperative Endocrine Surgery Clinic Note    Reason for visit / Chief complaint: Postoperative evaluation  Procedure:  Xi Robotic Adrenalectomy - Right  Procedure Date: 8/23/2022      Subjective:     Tomas Hong returns today for postoperative evaluation, he is approximately 3 weeks post op.    Procedure: Robotic right adrenalectomy    Operative findings: Right adrenal mass, no direct invasion into surrounding structures, small deposits of adrenal tissue adjacent to adrenal gland and adrenal tumor    He has had an uncomplicated postoperative course. Pain is well controlled.  Tolerating a regular diet.  Bowel movements are normal.  He is not taking any potassium supplementation. He is on his antihypertensive medication lisinopril 10 mg with normal blood pressure.    Current Outpatient Medications   Medication Sig Dispense Refill    fluticasone propionate (FLONASE) 50 mcg/actuation nasal spray 1 spray by Nasal route once daily at 6am.      ibuprofen (ADVIL,MOTRIN) 800 MG tablet Take 800 mg by mouth once daily at 6am.      lisinopriL 10 MG tablet Take 10 mg by mouth once daily.      varenicline (CHANTIX) 1 mg Tab Take 1 mg by mouth 2 (two) times daily.       No current facility-administered medications for this visit.     Review of patient's allergies indicates:  No Known Allergies     Review of Systems  Negative except as per HPI.     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 The lesion marked with surgical loop designated as possible adrenal tissue deposit, represents benign adrenal gland tissue. This tumor has a score of 1 (1 point for diffuse growth pattern); most consistent with a benign clinical course. Although necrosis is identified, this represents ischemic type necrosis and degeneration due to lack of blood supply rather than tumor type necrosis and is therefore not counted towards the Swift score. Although not part of the Swift criteria, a size of greater than 4 cm has also been used in literature to denote higher risk of malignancyThere is no invasion of local structures or periadrenal fat identified in the specimen. The lesion marked with surgical loop designated as possible adrenal tissue deposit, represents benign adrenal gland tissue. This tumor has a score of 1 (1 point for diffuse growth pattern); most consistent with a benign clinical course. Although necrosis is identified, this represents ischemic type necrosis and degeneration due to lack of blood supply rather than tumor type necrosis and is therefore not counted towards the Swift score. 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         Current Assessment & Plan     .S/p Xi Robotic Adrenalectomy - Right on 8/23/2022.  Doing well postoperatively.     - Reviewed pathology, low risk of malignancy based on Swift score; small deposits of adrenal tissue identified in the periadrenal adipose tissue  - Will review the pathology at the Endocrine " Multidisciplinary Tumor Board and follow up with Dr. Best accordingly  - Incision care discussed  - Discontinue narcotics  - Increase activities as tolerated.  - Return to full duty  - Follow up with endocrine surgery as needed          Patient evaluated with surgery resident MD Rocio Greenberg MD  Endocrine Surgery  9/12/22

## 2022-09-12 ENCOUNTER — OFFICE VISIT (OUTPATIENT)
Dept: SURGERY | Facility: CLINIC | Age: 57
End: 2022-09-12
Payer: MEDICARE

## 2022-09-12 VITALS
OXYGEN SATURATION: 97 % | SYSTOLIC BLOOD PRESSURE: 126 MMHG | BODY MASS INDEX: 30.98 KG/M2 | DIASTOLIC BLOOD PRESSURE: 79 MMHG | HEIGHT: 75 IN | WEIGHT: 249.13 LBS | HEART RATE: 68 BPM

## 2022-09-12 DIAGNOSIS — E27.8 RIGHT ADRENAL MASS: ICD-10-CM

## 2022-09-12 PROCEDURE — 99024 PR POST-OP FOLLOW-UP VISIT: ICD-10-PCS | Mod: POP,,, | Performed by: STUDENT IN AN ORGANIZED HEALTH CARE EDUCATION/TRAINING PROGRAM

## 2022-09-12 PROCEDURE — 99024 POSTOP FOLLOW-UP VISIT: CPT | Mod: POP,,, | Performed by: STUDENT IN AN ORGANIZED HEALTH CARE EDUCATION/TRAINING PROGRAM

## 2022-09-12 PROCEDURE — 99212 OFFICE O/P EST SF 10 MIN: CPT | Mod: PBBFAC | Performed by: STUDENT IN AN ORGANIZED HEALTH CARE EDUCATION/TRAINING PROGRAM

## 2022-09-12 PROCEDURE — 99999 PR PBB SHADOW E&M-EST. PATIENT-LVL II: CPT | Mod: PBBFAC,,, | Performed by: STUDENT IN AN ORGANIZED HEALTH CARE EDUCATION/TRAINING PROGRAM

## 2022-09-12 PROCEDURE — 99999 PR PBB SHADOW E&M-EST. PATIENT-LVL II: ICD-10-PCS | Mod: PBBFAC,,, | Performed by: STUDENT IN AN ORGANIZED HEALTH CARE EDUCATION/TRAINING PROGRAM

## 2022-09-12 NOTE — Clinical Note
Pawan Chambers,  I saw Mr. 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  Intraoperatively I noted extra-adrenal deposits and the pathologist noted these were described as benign adrenal tissue.  I am submitting the case for review at our

## 2022-09-12 NOTE — ASSESSMENT & PLAN NOTE
.S/p Xi Robotic Adrenalectomy - Right on 8/23/2022.  Doing well postoperatively.     - Reviewed pathology, low risk of malignancy based on Swift score; small deposits of adrenal tissue identified in the periadrenal adipose tissue  - Will review the pathology at the Endocrine Multidisciplinary Tumor Board and follow up with Dr. Best accordingly  - Incision care discussed  - Discontinue narcotics  - Increase activities as tolerated.  - Return to full duty  - Follow up with endocrine surgery as needed

## 2022-09-20 ENCOUNTER — TELEPHONE (OUTPATIENT)
Dept: ENDOCRINOLOGY | Facility: CLINIC | Age: 57
End: 2022-09-20
Payer: MEDICARE

## 2022-09-20 NOTE — TELEPHONE ENCOUNTER
----- Message from Reyes Mitchell MD sent at 9/12/2022  1:51 PM CDT -----  Regarding: [facility] needs follow up

## 2022-09-26 ENCOUNTER — PATIENT MESSAGE (OUTPATIENT)
Dept: SURGERY | Facility: CLINIC | Age: 57
End: 2022-09-26
Payer: MEDICARE

## 2022-09-26 DIAGNOSIS — R53.83 FATIGUE, UNSPECIFIED TYPE: Primary | ICD-10-CM

## 2022-09-27 ENCOUNTER — PATIENT MESSAGE (OUTPATIENT)
Dept: SURGERY | Facility: CLINIC | Age: 57
End: 2022-09-27
Payer: MEDICARE

## 2022-11-15 ENCOUNTER — PATIENT MESSAGE (OUTPATIENT)
Dept: ADMINISTRATIVE | Facility: OTHER | Age: 57
End: 2022-11-15
Payer: MEDICARE

## 2023-07-26 ENCOUNTER — OFFICE VISIT (OUTPATIENT)
Dept: CARDIOLOGY | Facility: CLINIC | Age: 58
End: 2023-07-26
Payer: MEDICARE

## 2023-07-26 VITALS
DIASTOLIC BLOOD PRESSURE: 85 MMHG | BODY MASS INDEX: 30.76 KG/M2 | WEIGHT: 247.38 LBS | HEIGHT: 75 IN | HEART RATE: 67 BPM | SYSTOLIC BLOOD PRESSURE: 127 MMHG

## 2023-07-26 DIAGNOSIS — Z01.810 PREOPERATIVE CARDIOVASCULAR EXAMINATION: ICD-10-CM

## 2023-07-26 DIAGNOSIS — I10 PRIMARY HYPERTENSION: ICD-10-CM

## 2023-07-26 DIAGNOSIS — K51.919 ULCERATIVE COLITIS WITH COMPLICATION, UNSPECIFIED LOCATION: ICD-10-CM

## 2023-07-26 DIAGNOSIS — R07.89 OTHER CHEST PAIN: Primary | ICD-10-CM

## 2023-07-26 DIAGNOSIS — R00.2 PALPITATIONS: ICD-10-CM

## 2023-07-26 DIAGNOSIS — F17.200 SMOKING: ICD-10-CM

## 2023-07-26 DIAGNOSIS — E66.9 OBESITY (BMI 30.0-34.9): ICD-10-CM

## 2023-07-26 PROCEDURE — 93010 ELECTROCARDIOGRAM REPORT: CPT | Mod: S$PBB,,, | Performed by: INTERNAL MEDICINE

## 2023-07-26 PROCEDURE — 99999 PR PBB SHADOW E&M-EST. PATIENT-LVL IV: ICD-10-PCS | Mod: PBBFAC,,, | Performed by: INTERNAL MEDICINE

## 2023-07-26 PROCEDURE — 99999 PR PBB SHADOW E&M-EST. PATIENT-LVL IV: CPT | Mod: PBBFAC,,, | Performed by: INTERNAL MEDICINE

## 2023-07-26 PROCEDURE — 93010 EKG 12-LEAD: ICD-10-PCS | Mod: S$PBB,,, | Performed by: INTERNAL MEDICINE

## 2023-07-26 PROCEDURE — 93005 ELECTROCARDIOGRAM TRACING: CPT | Mod: PBBFAC,PO | Performed by: INTERNAL MEDICINE

## 2023-07-26 PROCEDURE — 99205 OFFICE O/P NEW HI 60 MIN: CPT | Mod: 25,S$PBB,, | Performed by: INTERNAL MEDICINE

## 2023-07-26 PROCEDURE — 99214 OFFICE O/P EST MOD 30 MIN: CPT | Mod: PBBFAC,PO | Performed by: INTERNAL MEDICINE

## 2023-07-26 PROCEDURE — 99205 PR OFFICE/OUTPT VISIT, NEW, LEVL V, 60-74 MIN: ICD-10-PCS | Mod: 25,S$PBB,, | Performed by: INTERNAL MEDICINE

## 2023-07-26 RX ORDER — ADALIMUMAB 40MG/0.4ML
40 KIT SUBCUTANEOUS
COMMUNITY
Start: 2023-06-30

## 2023-07-26 RX ORDER — TRAMADOL HYDROCHLORIDE 50 MG/1
50 TABLET ORAL
COMMUNITY
Start: 2023-06-23

## 2023-07-26 RX ORDER — FAMOTIDINE 20 MG/1
20 TABLET, FILM COATED ORAL
COMMUNITY
Start: 2023-07-25

## 2023-07-26 RX ORDER — ACETAMINOPHEN 500 MG
TABLET ORAL
COMMUNITY
Start: 2023-07-25

## 2023-07-26 RX ORDER — ASPIRIN 81 MG/1
81 TABLET ORAL
COMMUNITY
Start: 2023-07-23 | End: 2024-07-22

## 2023-07-26 RX ORDER — METOCLOPRAMIDE 10 MG/1
10 TABLET ORAL
COMMUNITY
Start: 2023-07-25

## 2023-07-26 NOTE — PROGRESS NOTES
"[facility]      Chief Complaint   Patient presents with    Palpitations    Pre-op exam R. Hip replacement surgary clearance       Patient ID: Tomas Hong is a 57 y.o. male with HTN, ulcerative colitis, obesity, smoking, who presents for an initial appointment.  Pertinent history/events are as follows:     -Pt presents for preoperative cardiac risk stratification prior to right total hip arthroplasty.     HPI:  Mr. Hong is accompanied by his wife.  He reports an episode of chest pain and palpitations 6-7 weeks ago while taking out the trash.  Episode lasted approximately 1 minute.  Chest pain is described as "pressure and hard", located at at mid chest, non-radiating, 7/10 in intensity, with associated nausea and dizziness.  He reports daily episodes of chest pain and palpitations since that time.  Last episode occurred 4 days ago.  Smokes a pack a day for total of 49 years.  Reports family history of CAD with MI in mother in mid 60's, and father in mid 50's.  Formerly worked in construction for several years.  EKG today shows normal sinus rhythm with no ischemic ST/T-wave changes.    Past Medical History:   Diagnosis Date    Avascular necrosis of hip L hip    Hypertension     Ulcerative colitis      Past Surgical History:   Procedure Laterality Date    CARPAL TUNNEL RELEASE Left 05/01/2022    Carpel tunnel surgery left    JOINT REPLACEMENT Left 05/01/2016    Left Hip replacement    ROBOT-ASSISTED SURGICAL REMOVAL OF ADRENAL GLAND USING DA [place] XI Right 8/23/2022    Procedure: XI ROBOTIC ADRENALECTOMY;  Surgeon: Rocio Dunlap MD;  Location: [facility];  Service: General;  Laterality: Right;  Surgery admit- case is outpatient surgery     Social History     Socioeconomic History    Marital status:    Occupational History    Occupation: disabled   Tobacco Use    Smoking status: Every Day     Packs/day: 0.25     Years: 40.00     Pack years: 10.00     Types: Cigarettes    Smokeless tobacco: " Never    Tobacco comments:     2 packs  cig/ 40 years- as of 7/21/2022 5 cigs/day   Substance and Sexual Activity    Alcohol use: No    Drug use: Never    Sexual activity: Yes     Partners: Female     Social Determinants of Health     Financial Resource Strain: Low Risk     Difficulty of Paying Living Expenses: Not hard at all   Food Insecurity: No Food Insecurity    Worried About Running Out of Food in the Last Year: Never true    Ran Out of Food in the Last Year: Never true   Transportation Needs: No Transportation Needs    Lack of Transportation (Medical): No    Lack of Transportation (Non-Medical): No   Physical Activity: Sufficiently Active    Days of Exercise per Week: 7 days    Minutes of Exercise per Session: 30 min   Stress: No Stress Concern Present    Feeling of Stress : Only a little   Social Connections: Unknown    Frequency of Communication with Friends and Family: More than three times a week    Frequency of Social Gatherings with Friends and Family: More than three times a week    Active Member of Clubs or Organizations: No    Attends Club or Organization Meetings: Never    Marital Status:    Housing Stability: Unknown    Unable to Pay for Housing in the Last Year: Patient refused    Number of Places Lived in the Last Year: 1    Unstable Housing in the Last Year: No     No family history on file.    Review of patient's allergies indicates:  No Known Allergies    Medication List with Changes/Refills   Current Medications    ACETAMINOPHEN (TYLENOL) 500 MG TABLET    Take 2 tablets morning of surgery with sip of water    ASPIRIN (ECOTRIN) 81 MG EC TABLET    Take 81 mg by mouth.    FAMOTIDINE (PEPCID) 20 MG TABLET    Take 20 mg by mouth.    FLUTICASONE PROPIONATE (FLONASE) 50 MCG/ACTUATION NASAL SPRAY    1 spray by Nasal route once daily at 6am.    HUMIRA,CF, PEN 40 MG/0.4 ML PNKT    Inject 40 mg into the skin.    IBUPROFEN (ADVIL,MOTRIN) 800 MG TABLET    Take 800 mg by mouth once daily at 6am.     "LISINOPRIL 10 MG TABLET    Take 10 mg by mouth once daily.    METOCLOPRAMIDE HCL (REGLAN) 10 MG TABLET    Take 10 mg by mouth.    MULTIVITAMIN-CA-IRON-MINERALS TAB    Take 1 capsule by mouth once daily.    TRAMADOL (ULTRAM) 50 MG TABLET    Take 50 mg by mouth.   Discontinued Medications    VARENICLINE (CHANTIX) 1 MG TAB    Take 1 mg by mouth 2 (two) times daily.       Review of Systems  Constitution: Denies chills, fever, and sweats.  HENT: Denies headaches or blurry vision.  Cardiovascular: Denies chest pain or irregular heart beat.  Respiratory: Denies cough or shortness of breath.  Gastrointestinal: Denies abdominal pain, nausea, or vomiting.  Musculoskeletal: Denies muscle cramps.  Neurological: Denies dizziness or focal weakness.  Psychiatric/Behavioral: Normal mental status.  Hematologic/Lymphatic: Denies bleeding problem or easy bruising/bleeding.  Skin: Denies rash or suspicious lesions    Physical Examination  /85   Pulse 67   Ht 6' 3" (1.905 m)   Wt 112.2 kg (247 lb 5.7 oz)   BMI 30.92 kg/m²     Constitutional: No acute distress, conversant  HEENT: Sclera anicteric, Pupils equal, round and reactive to light, extraocular motions intact, Oropharynx clear  Neck: No JVD, no carotid bruits  Cardiovascular: regular rate and rhythm, no murmur, rubs or gallops, normal S1/S2  Pulmonary: Clear to auscultation bilaterally  Abdominal: Abdomen soft, nontender, nondistended, positive bowel sounds  Extremities: No lower extremity edema,   Pulses:  Carotid pulses are 2+ on the right side, and 2+ on the left side.  Radial pulses are 2+ on the right side, and 2+ on the left side.   Femoral pulses are 2+ on the right side, and 2+ on the left side.  Popliteal pulses are 2+ on the right side, and 2+ on the left side.   Dorsalis pedis pulses are 2+ on the right side, and 2+ on the left side.   Posterior tibial pulses are 2+ on the right side, and 2+ on the left side.    Skin: No ecchymosis, erythema, or ulcers  Psych: " Alert and oriented x 3, appropriate affect  Neuro: CNII-XII intact, no focal deficits    Labs:  Most Recent Data  CBC:   Lab Results   Component Value Date    WBC 6.97 08/23/2022    HGB 15.0 08/23/2022    HCT 43.4 08/23/2022     08/23/2022    MCV 94 08/23/2022    RDW 14.0 08/23/2022     BMP:   Lab Results   Component Value Date     09/08/2022    K 5.0 09/08/2022     09/08/2022    CO2 26 09/08/2022    BUN 18 09/08/2022    CREATININE 1.3 09/08/2022    GLU 68 (L) 09/08/2022    CALCIUM 9.8 09/08/2022    PHOS 3.4 09/08/2022     LFTS;   Lab Results   Component Value Date    PROT 7.2 08/23/2022    ALBUMIN 4.2 09/08/2022    BILITOT 0.4 08/23/2022    AST 28 08/23/2022    ALKPHOS 65 08/23/2022    ALT 34 08/23/2022     COAGS: No results found for: INR, PROTIME, PTT  FLP: No results found for: CHOL, HDL, LDLCALC, TRIG, CHOLHDL  CARDIAC: No results found for: TROPONINI, CKMB, BNP    Imaging:    EKG 7/26/2023:  normal sinus rhythm with no ischemic ST/T-wave changes    Assessment/Plan:  Tomas Hong is a 57 y.o. male with HTN, ulcerative colitis, obesity, smoking, who presents for an initial appointment.    Chest Pain/Palpitations/Preoperative Cardiac Risk Stratification Prior to Right Total Hip Arthroplasty - Pt with several risk factors for CAD.  Check nuclear stress test, echo, and 48 hour Holter to complete preoperative cardiac risk stratification.      2. HTN- Continue current medications.    3. Smoking- Refer to smoking cessation.    4. Obesity- Encourage diet, exercise, and weight loss.    Will call pt with results of stress test, echo and 48 hour Holter monitor  Otherwise, follow up in 3 months with lipids prior    Total duration of face to face visit time 30 minutes.  Total time spent counseling greater than fifty percent of total visit time.  Counseling included discussion regarding imaging findings, diagnosis, possibilities, treatment options, risks and benefits.  The patient had many questions  regarding the options and long-term effects.    Pradeep Cole MD, PhD  Interventional Cardiology

## 2023-07-26 NOTE — PATIENT INSTRUCTIONS
Assessment/Plan:  Tomas Hong is a 57 y.o. male with HTN, ulcerative colitis, obesity, smoking, who presents for an initial appointment.    Chest Pain/Palpitations/Preoperative Cardiac Risk Stratification Prior to Right Total Hip Arthroplasty - Pt with several risk factors for CAD.  Check nuclear stress test, echo, and 48 hour Holter to complete preoperative cardiac risk stratification.      2. HTN- Continue current medications.    3. Smoking- Refer to smoking cessation.    4. Obesity- Encourage diet, exercise, and weight loss.    Will call pt with results of stress test, echo and 48 hour Holter monitor  Otherwise, follow up in 3 months with lipids prior

## 2023-07-27 ENCOUNTER — HOSPITAL ENCOUNTER (OUTPATIENT)
Dept: CARDIOLOGY | Facility: HOSPITAL | Age: 58
Discharge: HOME | End: 2023-07-27
Attending: INTERNAL MEDICINE
Payer: MEDICARE

## 2023-07-27 VITALS — HEIGHT: 75 IN | BODY MASS INDEX: 30.71 KG/M2 | WEIGHT: 247 LBS

## 2023-07-27 DIAGNOSIS — R07.89 OTHER CHEST PAIN: ICD-10-CM

## 2023-07-27 DIAGNOSIS — Z01.810 PREOPERATIVE CARDIOVASCULAR EXAMINATION: ICD-10-CM

## 2023-07-27 DIAGNOSIS — R00.2 PALPITATIONS: ICD-10-CM

## 2023-07-27 LAB
AV INDEX (PROSTH): 0.69
AV MEAN GRADIENT: 3 MMHG
AV PEAK GRADIENT: 7 MMHG
AV VALVE AREA: 2.19 CM2
AV VELOCITY RATIO: 0.79
BSA FOR ECHO PROCEDURE: 2.43 M2
CV ECHO LV RWT: 0.4 CM
DOP CALC AO PEAK VEL: 1.28 M/S
DOP CALC AO VTI: 25.2 CM
DOP CALC LVOT AREA: 3.2 CM2
DOP CALC LVOT DIAMETER: 2.01 CM
DOP CALC LVOT PEAK VEL: 1.01 M/S
DOP CALC LVOT STROKE VOLUME: 55.18 CM3
DOP CALC MV VTI: 22.8 CM
DOP CALCLVOT PEAK VEL VTI: 17.4 CM
E WAVE DECELERATION TIME: 181.4 MSEC
E/A RATIO: 1.13
E/E' RATIO: 7.29 M/S
ECHO LV POSTERIOR WALL: 0.81 CM (ref 0.6–1.1)
EJECTION FRACTION: 55 %
FRACTIONAL SHORTENING: 29 % (ref 28–44)
INTERVENTRICULAR SEPTUM: 0.77 CM (ref 0.6–1.1)
IVC DIAMETER: 0.97 CM
IVRT: 125.59 MSEC
LA MAJOR: 4.12 CM
LA MINOR: 5.12 CM
LA WIDTH: 2.5 CM
LEFT ATRIUM SIZE: 3.03 CM
LEFT ATRIUM VOLUME INDEX MOD: 15.8 ML/M2
LEFT ATRIUM VOLUME INDEX: 12.2 ML/M2
LEFT ATRIUM VOLUME MOD: 37.97 CM3
LEFT ATRIUM VOLUME: 29.4 CM3
LEFT INTERNAL DIMENSION IN SYSTOLE: 2.9 CM (ref 2.1–4)
LEFT VENTRICLE DIASTOLIC VOLUME INDEX: 75.53 ML/M2
LEFT VENTRICLE DIASTOLIC VOLUME: 181.26 ML
LEFT VENTRICLE MASS INDEX: 40 G/M2
LEFT VENTRICLE SYSTOLIC VOLUME INDEX: 35.7 ML/M2
LEFT VENTRICLE SYSTOLIC VOLUME: 85.78 ML
LEFT VENTRICULAR INTERNAL DIMENSION IN DIASTOLE: 4.1 CM (ref 3.5–6)
LEFT VENTRICULAR MASS: 95.72 G
LV LATERAL E/E' RATIO: 6.2 M/S
LV SEPTAL E/E' RATIO: 8.86 M/S
LVOT MG: 2.49 MMHG
LVOT MV: 0.75 CM/S
MV MEAN GRADIENT: 1 MMHG
MV PEAK A VEL: 0.55 M/S
MV PEAK E VEL: 0.62 M/S
MV PEAK GRADIENT: 2 MMHG
MV STENOSIS PRESSURE HALF TIME: 52.61 MS
MV VALVE AREA BY CONTINUITY EQUATION: 2.42 CM2
MV VALVE AREA P 1/2 METHOD: 4.18 CM2
OHS LV EJECTION FRACTION SIMPSONS BIPLANE MOD: 6 %
PULM VEIN S/D RATIO: 1.04
PV PEAK D VEL: 0.54 M/S
PV PEAK S VEL: 0.56 M/S
PV PEAK VELOCITY: 0.75 CM/S
RA MAJOR: 3.89 CM
RA PRESSURE: 3 MMHG
RA WIDTH: 2.9 CM
SINUS: 3.8 CM
TDI LATERAL: 0.1 M/S
TDI SEPTAL: 0.07 M/S
TDI: 0.09 M/S

## 2023-07-27 PROCEDURE — 93227 HOLTER MONITOR - 48 HOUR (CUPID ONLY): ICD-10-PCS | Mod: ,,, | Performed by: INTERNAL MEDICINE

## 2023-07-27 PROCEDURE — 93227 XTRNL ECG REC<48 HR R&I: CPT | Mod: ,,, | Performed by: INTERNAL MEDICINE

## 2023-07-27 PROCEDURE — 93306 TTE W/DOPPLER COMPLETE: CPT | Mod: 26,,, | Performed by: INTERNAL MEDICINE

## 2023-07-27 PROCEDURE — 93306 ECHO (CUPID ONLY): ICD-10-PCS | Mod: 26,,, | Performed by: INTERNAL MEDICINE

## 2023-07-27 PROCEDURE — 93225 XTRNL ECG REC<48 HRS REC: CPT | Mod: PO

## 2023-07-27 PROCEDURE — 93306 TTE W/DOPPLER COMPLETE: CPT | Mod: PO

## 2023-07-31 ENCOUNTER — TELEPHONE (OUTPATIENT)
Dept: CARDIOLOGY | Facility: HOSPITAL | Age: 58
End: 2023-07-31
Payer: MEDICARE

## 2023-08-02 ENCOUNTER — HOSPITAL ENCOUNTER (OUTPATIENT)
Dept: CARDIOLOGY | Facility: HOSPITAL | Age: 58
Discharge: HOME | End: 2023-08-02
Attending: INTERNAL MEDICINE
Payer: MEDICARE

## 2023-08-02 VITALS
SYSTOLIC BLOOD PRESSURE: 144 MMHG | BODY MASS INDEX: 30.71 KG/M2 | RESPIRATION RATE: 16 BRPM | WEIGHT: 247 LBS | HEART RATE: 61 BPM | DIASTOLIC BLOOD PRESSURE: 89 MMHG | HEIGHT: 75 IN

## 2023-08-02 DIAGNOSIS — R00.2 PALPITATIONS: ICD-10-CM

## 2023-08-02 DIAGNOSIS — Z01.810 PREOPERATIVE CARDIOVASCULAR EXAMINATION: ICD-10-CM

## 2023-08-02 DIAGNOSIS — R07.89 OTHER CHEST PAIN: ICD-10-CM

## 2023-08-02 LAB
CV STRESS BASE HR: 61 BPM
DIASTOLIC BLOOD PRESSURE: 89 MMHG
EJECTION FRACTION- HIGH: 65 %
END DIASTOLIC INDEX-HIGH: 153 ML/M2
END DIASTOLIC INDEX-LOW: 93 ML/M2
END SYSTOLIC INDEX-HIGH: 71 ML/M2
END SYSTOLIC INDEX-LOW: 31 ML/M2
NUC REST DIASTOLIC VOLUME INDEX: 118
NUC REST EJECTION FRACTION: 58
NUC REST SYSTOLIC VOLUME INDEX: 49
NUC STRESS DIASTOLIC VOLUME INDEX: 130
NUC STRESS EJECTION FRACTION: 59 %
NUC STRESS SYSTOLIC VOLUME INDEX: 53
OHS CV CPX 1 MINUTE RECOVERY HEART RATE: 82 BPM
OHS CV CPX 85 PERCENT MAX PREDICTED HEART RATE MALE: 140
OHS CV CPX MAX PREDICTED HEART RATE: 165
OHS CV CPX PATIENT IS FEMALE: 0
OHS CV CPX PATIENT IS MALE: 1
OHS CV CPX PEAK DIASTOLIC BLOOD PRESSURE: 88 MMHG
OHS CV CPX PEAK HEAR RATE: 82 BPM
OHS CV CPX PEAK RATE PRESSURE PRODUCT: NORMAL
OHS CV CPX PEAK SYSTOLIC BLOOD PRESSURE: 147 MMHG
OHS CV CPX PERCENT MAX PREDICTED HEART RATE ACHIEVED: 50
OHS CV CPX RATE PRESSURE PRODUCT PRESENTING: 8784
RETIRED EF AND QEF - SEE NOTES: 53 %
SYSTOLIC BLOOD PRESSURE: 144 MMHG

## 2023-08-02 PROCEDURE — 63600175 PHARM REV CODE 636 W HCPCS: Performed by: INTERNAL MEDICINE

## 2023-08-02 PROCEDURE — 78452 HT MUSCLE IMAGE SPECT MULT: CPT

## 2023-08-02 PROCEDURE — 93018 NUCLEAR STRESS - CARDIOLOGY INTERPRETED (CUPID ONLY): ICD-10-PCS | Mod: ,,, | Performed by: INTERNAL MEDICINE

## 2023-08-02 PROCEDURE — 93016 CV STRESS TEST SUPVJ ONLY: CPT | Mod: ,,, | Performed by: INTERNAL MEDICINE

## 2023-08-02 PROCEDURE — 78452 HT MUSCLE IMAGE SPECT MULT: CPT | Mod: 26,,, | Performed by: INTERNAL MEDICINE

## 2023-08-02 PROCEDURE — 78452 NUCLEAR STRESS - CARDIOLOGY INTERPRETED (CUPID ONLY): ICD-10-PCS | Mod: 26,,, | Performed by: INTERNAL MEDICINE

## 2023-08-02 PROCEDURE — A9502 TC99M TETROFOSMIN: HCPCS

## 2023-08-02 PROCEDURE — 93016 NUCLEAR STRESS - CARDIOLOGY INTERPRETED (CUPID ONLY): ICD-10-PCS | Mod: ,,, | Performed by: INTERNAL MEDICINE

## 2023-08-02 PROCEDURE — 93018 CV STRESS TEST I&R ONLY: CPT | Mod: ,,, | Performed by: INTERNAL MEDICINE

## 2023-08-02 RX ORDER — REGADENOSON 0.08 MG/ML
0.4 INJECTION, SOLUTION INTRAVENOUS
Status: DISCONTINUED | OUTPATIENT
Start: 2023-08-02 | End: 2023-08-02

## 2023-08-02 RX ORDER — AMINOPHYLLINE 25 MG/ML
75 INJECTION, SOLUTION INTRAVENOUS
Status: COMPLETED | OUTPATIENT
Start: 2023-08-02 | End: 2023-08-02

## 2023-08-02 RX ADMIN — REGADENOSON 0.4 MG: 0.08 INJECTION, SOLUTION INTRAVENOUS at 08:08

## 2023-08-02 RX ADMIN — AMINOPHYLLINE 75 MG: 25 INJECTION, SOLUTION INTRAVENOUS at 08:08

## 2023-08-04 ENCOUNTER — TELEPHONE (OUTPATIENT)
Dept: CARDIOLOGY | Facility: CLINIC | Age: 58
End: 2023-08-04
Payer: MEDICARE

## 2023-08-04 LAB
OHS CV EVENT MONITOR DAY: 0
OHS CV HOLTER LENGTH DECIMAL HOURS: 19.33
OHS CV HOLTER LENGTH HOURS: 19
OHS CV HOLTER LENGTH MINUTES: 20
OHS CV HOLTER SINUS AVERAGE HR: 66
OHS CV HOLTER SINUS MAX HR: 97
OHS CV HOLTER SINUS MIN HR: 49

## 2023-08-04 NOTE — TELEPHONE ENCOUNTER
----- Message from Natalia Mckeon sent at 8/4/2023 10:24 AM CDT -----  Regarding: Fax  Good Morning    Anabella Francis, would like to know if fax was received for pt clearance.    Please call @ [phone]  [facility]    Thanks

## 2023-08-04 NOTE — TELEPHONE ENCOUNTER
Returned a call to Anabella Francis at [phone], [facility]. Left a message to let the office know that a fax was not received on the pt and to please refax.

## 2023-08-31 ENCOUNTER — TELEPHONE (OUTPATIENT)
Dept: SMOKING CESSATION | Facility: CLINIC | Age: 58
End: 2023-08-31
Payer: MEDICARE

## 2023-08-31 NOTE — TELEPHONE ENCOUNTER
Telephoned patient for follow up after miessed intake appointment. Patient was unavailable at this time.  I left a detailed message with nature of call and contact information.

## 2024-05-29 ENCOUNTER — HOSPITAL ENCOUNTER (OUTPATIENT)
Dept: RADIOLOGY | Facility: HOSPITAL | Age: 59
Discharge: HOME | End: 2024-05-29
Attending: INTERNAL MEDICINE
Payer: MEDICARE

## 2024-05-29 DIAGNOSIS — K51.90 UC (ULCERATIVE COLITIS): ICD-10-CM

## 2024-05-29 DIAGNOSIS — K51.90 UC (ULCERATIVE COLITIS): Primary | ICD-10-CM

## 2024-05-29 PROCEDURE — 25500020 PHARM REV CODE 255: Performed by: INTERNAL MEDICINE

## 2024-05-29 PROCEDURE — 74177 CT ABD & PELVIS W/CONTRAST: CPT | Mod: 26,,, | Performed by: RADIOLOGY

## 2024-05-29 PROCEDURE — 74177 CT ABD & PELVIS W/CONTRAST: CPT | Mod: TC

## 2024-05-29 RX ADMIN — IOHEXOL 100 ML: 350 INJECTION, SOLUTION INTRAVENOUS at 01:05

## (undated) DEVICE — SUT VICRYL 3-0 27 SH

## (undated) DEVICE — SYR 10CC LUER LOCK

## (undated) DEVICE — SUT 0 VICRYL PLUS CT-1 27IN

## (undated) DEVICE — SUT MCRYL PLUS 4-0 PS2 27IN

## (undated) DEVICE — BAG TISS RETRV MONARCH 10MM

## (undated) DEVICE — BAG INZII TISS RETRV 12/15MM

## (undated) DEVICE — SUT VICRYL PLUS 4-0 P3 18IN

## (undated) DEVICE — STAPLER SKIN ROTATING HEAD

## (undated) DEVICE — TRAY DO THE ROBOT

## (undated) DEVICE — MARKER SKIN STND TIP BLUE BARR

## (undated) DEVICE — BLADE SURG CARBON STEEL SZ11

## (undated) DEVICE — DRAPE COLUMN DAVINCI XI

## (undated) DEVICE — KIT PROCEDURE STER INLET CLOSU

## (undated) DEVICE — BAG TISSUE RETRIEVAL 225ML

## (undated) DEVICE — VIDEO RENTAL SERVICE

## (undated) DEVICE — DRAPE ARM DAVINCI XI

## (undated) DEVICE — NDL HYPO REG 25G X 1 1/2

## (undated) DEVICE — KIT ANTIFOG W/SPONG & FLUID

## (undated) DEVICE — TROCAR ENDOPATH XCEL 5MM 7.5CM

## (undated) DEVICE — CANNULA REDUCER 12-8MM

## (undated) DEVICE — POWDER ARISTA AH 3G

## (undated) DEVICE — HEMOSTAT SURGICEL NU-KNIT 6X9

## (undated) DEVICE — SEAL UNIVERSAL 5MM-8MM XI

## (undated) DEVICE — TOWEL OR DISP STRL BLUE 4/PK

## (undated) DEVICE — DRAPE STERI LONG

## (undated) DEVICE — ELECTRODE REM PLYHSV RETURN 9

## (undated) DEVICE — SOL ELECTROLUBE ANTI-STIC

## (undated) DEVICE — SEALER VESSEL EXTEND

## (undated) DEVICE — SUT VICRYL+ 27 UR-6 VIOL

## (undated) DEVICE — IRRIGATOR ENDOSCOPY DISP.

## (undated) DEVICE — SET TRI-LUMEN FILTERED TUBE

## (undated) DEVICE — OBTURATOR BLADELESS 8MM XI CLR